# Patient Record
Sex: FEMALE | Race: BLACK OR AFRICAN AMERICAN | NOT HISPANIC OR LATINO | URBAN - METROPOLITAN AREA
[De-identification: names, ages, dates, MRNs, and addresses within clinical notes are randomized per-mention and may not be internally consistent; named-entity substitution may affect disease eponyms.]

---

## 2017-05-23 ENCOUNTER — EMERGENCY (EMERGENCY)
Facility: HOSPITAL | Age: 22
LOS: 0 days | Discharge: HOME | End: 2017-05-24
Attending: EMERGENCY MEDICINE
Payer: MEDICAID

## 2017-05-23 VITALS
TEMPERATURE: 98 F | DIASTOLIC BLOOD PRESSURE: 44 MMHG | SYSTOLIC BLOOD PRESSURE: 110 MMHG | HEART RATE: 60 BPM | RESPIRATION RATE: 16 BRPM | OXYGEN SATURATION: 100 %

## 2017-05-23 VITALS
HEIGHT: 64 IN | RESPIRATION RATE: 16 BRPM | WEIGHT: 128.09 LBS | OXYGEN SATURATION: 99 % | TEMPERATURE: 98 F | HEART RATE: 73 BPM | DIASTOLIC BLOOD PRESSURE: 54 MMHG | SYSTOLIC BLOOD PRESSURE: 112 MMHG

## 2017-05-23 LAB
APPEARANCE UR: CLEAR — SIGNIFICANT CHANGE UP
BILIRUB UR-MCNC: NEGATIVE — SIGNIFICANT CHANGE UP
COLOR SPEC: YELLOW — SIGNIFICANT CHANGE UP
DIFF PNL FLD: NEGATIVE — SIGNIFICANT CHANGE UP
GLUCOSE UR QL: NEGATIVE MG/DL — SIGNIFICANT CHANGE UP
HCG UR QL: NEGATIVE — SIGNIFICANT CHANGE UP
KETONES UR-MCNC: NEGATIVE — SIGNIFICANT CHANGE UP
LEUKOCYTE ESTERASE UR-ACNC: ABNORMAL
NITRITE UR-MCNC: NEGATIVE — SIGNIFICANT CHANGE UP
PH UR: 7 — SIGNIFICANT CHANGE UP (ref 5–8)
PROT UR-MCNC: NEGATIVE MG/DL — SIGNIFICANT CHANGE UP
SP GR SPEC: 1.01 — SIGNIFICANT CHANGE UP (ref 1.01–1.02)
UROBILINOGEN FLD QL: NEGATIVE MG/DL — SIGNIFICANT CHANGE UP

## 2017-05-23 PROCEDURE — 99284 EMERGENCY DEPT VISIT MOD MDM: CPT

## 2017-05-23 RX ORDER — AZITHROMYCIN 500 MG/1
1000 TABLET, FILM COATED ORAL ONCE
Qty: 0 | Refills: 0 | Status: COMPLETED | OUTPATIENT
Start: 2017-05-23 | End: 2017-05-23

## 2017-05-23 RX ORDER — CEFTRIAXONE 500 MG/1
250 INJECTION, POWDER, FOR SOLUTION INTRAMUSCULAR; INTRAVENOUS ONCE
Qty: 0 | Refills: 0 | Status: COMPLETED | OUTPATIENT
Start: 2017-05-23 | End: 2017-05-23

## 2017-05-23 NOTE — ED ADULT NURSE NOTE - OBJECTIVE STATEMENT
Patient reports white vaginal discharge with itching for 4 days. Patient is sexually active. Denies antibiotic use. [id number]. LMP 2017

## 2017-05-24 DIAGNOSIS — N76.0 ACUTE VAGINITIS: ICD-10-CM

## 2017-05-24 DIAGNOSIS — L29.9 PRURITUS, UNSPECIFIED: ICD-10-CM

## 2017-05-24 LAB
BACTERIA # UR AUTO: ABNORMAL
COMMENT - URINE: SIGNIFICANT CHANGE UP
EPI CELLS # UR: ABNORMAL
WBC UR QL: ABNORMAL

## 2017-05-24 RX ORDER — METRONIDAZOLE 500 MG
1000 TABLET ORAL ONCE
Qty: 0 | Refills: 0 | Status: COMPLETED | OUTPATIENT
Start: 2017-05-24 | End: 2017-05-24

## 2017-05-24 RX ORDER — METRONIDAZOLE 500 MG
2 TABLET ORAL
Qty: 2 | Refills: 0 | OUTPATIENT
Start: 2017-05-24 | End: 2017-05-25

## 2017-05-24 RX ADMIN — CEFTRIAXONE 250 MILLIGRAM(S): 500 INJECTION, POWDER, FOR SOLUTION INTRAMUSCULAR; INTRAVENOUS at 00:58

## 2017-05-24 RX ADMIN — Medication 1000 MILLIGRAM(S): at 01:55

## 2017-05-24 RX ADMIN — AZITHROMYCIN 1000 MILLIGRAM(S): 500 TABLET, FILM COATED ORAL at 00:57

## 2017-05-24 NOTE — ED PROVIDER NOTE - OBJECTIVE STATEMENT
Pertinent PMH/PSH/FHx/SHx and Review of Systems contained within:  22 yo f with no PMH presents in ED c/o vaginal itching with foul smelling discharge s/p intercourse with boyfriend. No aggravating or relieving factors, No fever/chills, No photophobia/eye pain/changes in vision, No ear pain/sore throat/dysphagia, No chest pain/palpitations, no SOB/cough/wheeze/stridor, No abdominal pain, No N/V/D, no dysuria/frequency, No neck/back pain, no rash, no changes in neurological status/function.

## 2017-05-24 NOTE — ED PROVIDER NOTE - MEDICAL DECISION MAKING DETAILS
Patient with probably BV and possible std. UA reviewed, GC/Chlamydia sent. Patient received metronidazole, ceftriaxone and azithormycin. Patient now discharged with care instructions. She will follow up with pmd and gyn. Discussed results and outcome of testing with the patient.  Patient advised to please follow up with their primary care doctor within the next 24 hours and return to the Emergency Department for worsening symptoms or any other concerns.  Patient advised that their doctor may call  to follow up on the specific results of the tests performed today in the emergency department.

## 2017-05-25 LAB
C TRACH RRNA SPEC QL NAA+PROBE: SIGNIFICANT CHANGE UP
CULTURE RESULTS: SIGNIFICANT CHANGE UP
N GONORRHOEA RRNA SPEC QL NAA+PROBE: SIGNIFICANT CHANGE UP
SPECIMEN SOURCE: SIGNIFICANT CHANGE UP
SPECIMEN SOURCE: SIGNIFICANT CHANGE UP

## 2018-09-11 ENCOUNTER — EMERGENCY (EMERGENCY)
Facility: HOSPITAL | Age: 23
LOS: 0 days | Discharge: ANOTHER HOSPITAL | End: 2018-09-11
Attending: EMERGENCY MEDICINE
Payer: MEDICAID

## 2018-09-11 VITALS — HEART RATE: 147 BPM | OXYGEN SATURATION: 95 %

## 2018-09-11 VITALS
DIASTOLIC BLOOD PRESSURE: 72 MMHG | HEART RATE: 68 BPM | OXYGEN SATURATION: 93 % | SYSTOLIC BLOOD PRESSURE: 97 MMHG | RESPIRATION RATE: 12 BRPM | WEIGHT: 130.07 LBS | HEIGHT: 67 IN

## 2018-09-11 DIAGNOSIS — I60.8 OTHER NONTRAUMATIC SUBARACHNOID HEMORRHAGE: ICD-10-CM

## 2018-09-11 DIAGNOSIS — I46.9 CARDIAC ARREST, CAUSE UNSPECIFIED: ICD-10-CM

## 2018-09-11 LAB
ALBUMIN SERPL ELPH-MCNC: 3.2 G/DL — LOW (ref 3.3–5)
ALP SERPL-CCNC: 65 U/L — SIGNIFICANT CHANGE UP (ref 40–120)
ALT FLD-CCNC: 34 U/L — SIGNIFICANT CHANGE UP (ref 12–78)
AMPHET UR-MCNC: NEGATIVE — SIGNIFICANT CHANGE UP
ANION GAP SERPL CALC-SCNC: 13 MMOL/L — SIGNIFICANT CHANGE UP (ref 5–17)
APAP SERPL-MCNC: < 2 UG/ML (ref 10–30)
APPEARANCE UR: CLEAR — SIGNIFICANT CHANGE UP
APTT BLD: 28.8 SEC — SIGNIFICANT CHANGE UP (ref 27.5–37.4)
AST SERPL-CCNC: 58 U/L — HIGH (ref 15–37)
BACTERIA # UR AUTO: ABNORMAL
BARBITURATES UR SCN-MCNC: NEGATIVE — SIGNIFICANT CHANGE UP
BASE EXCESS BLDA CALC-SCNC: -8.8 MMOL/L — LOW (ref -2–2)
BASOPHILS # BLD AUTO: 0.1 K/UL — SIGNIFICANT CHANGE UP (ref 0–0.2)
BASOPHILS NFR BLD AUTO: 0.7 % — SIGNIFICANT CHANGE UP (ref 0–2)
BENZODIAZ UR-MCNC: NEGATIVE — SIGNIFICANT CHANGE UP
BILIRUB SERPL-MCNC: 0.3 MG/DL — SIGNIFICANT CHANGE UP (ref 0.2–1.2)
BILIRUB UR-MCNC: NEGATIVE — SIGNIFICANT CHANGE UP
BLOOD GAS COMMENTS: SIGNIFICANT CHANGE UP
BLOOD GAS COMMENTS: SIGNIFICANT CHANGE UP
BLOOD GAS SOURCE: SIGNIFICANT CHANGE UP
BUN SERPL-MCNC: 12 MG/DL — SIGNIFICANT CHANGE UP (ref 7–23)
CALCIUM SERPL-MCNC: 7.9 MG/DL — LOW (ref 8.5–10.1)
CHLORIDE SERPL-SCNC: 108 MMOL/L — SIGNIFICANT CHANGE UP (ref 96–108)
CK MB BLD-MCNC: 1.2 % — SIGNIFICANT CHANGE UP (ref 0–3.5)
CK MB CFR SERPL CALC: 1.4 NG/ML — SIGNIFICANT CHANGE UP (ref 0.5–3.6)
CK SERPL-CCNC: 117 U/L — SIGNIFICANT CHANGE UP (ref 26–192)
CO2 SERPL-SCNC: 21 MMOL/L — LOW (ref 22–31)
COCAINE METAB.OTHER UR-MCNC: NEGATIVE — SIGNIFICANT CHANGE UP
COLOR SPEC: YELLOW — SIGNIFICANT CHANGE UP
CREAT SERPL-MCNC: 1.13 MG/DL — SIGNIFICANT CHANGE UP (ref 0.5–1.3)
DIFF PNL FLD: NEGATIVE — SIGNIFICANT CHANGE UP
EOSINOPHIL # BLD AUTO: 0.09 K/UL — SIGNIFICANT CHANGE UP (ref 0–0.5)
EOSINOPHIL NFR BLD AUTO: 0.6 % — SIGNIFICANT CHANGE UP (ref 0–6)
EPI CELLS # UR: SIGNIFICANT CHANGE UP
GLUCOSE BLDC GLUCOMTR-MCNC: 175 MG/DL — HIGH (ref 70–99)
GLUCOSE SERPL-MCNC: 136 MG/DL — HIGH (ref 70–99)
GLUCOSE UR QL: NEGATIVE MG/DL — SIGNIFICANT CHANGE UP
HCG SERPL-ACNC: <1 MIU/ML — SIGNIFICANT CHANGE UP
HCO3 BLDA-SCNC: 19 MMOL/L — LOW (ref 21–29)
HCT VFR BLD CALC: 43.1 % — SIGNIFICANT CHANGE UP (ref 34.5–45)
HGB BLD-MCNC: 13.9 G/DL — SIGNIFICANT CHANGE UP (ref 11.5–15.5)
HOROWITZ INDEX BLDA+IHG-RTO: 1 — SIGNIFICANT CHANGE UP
IMM GRANULOCYTES NFR BLD AUTO: 0.3 % — SIGNIFICANT CHANGE UP (ref 0–1.5)
INR BLD: 0.98 RATIO — SIGNIFICANT CHANGE UP (ref 0.88–1.16)
KETONES UR-MCNC: NEGATIVE — SIGNIFICANT CHANGE UP
LACTATE SERPL-SCNC: 4.6 MMOL/L — CRITICAL HIGH (ref 0.7–2)
LEUKOCYTE ESTERASE UR-ACNC: NEGATIVE — SIGNIFICANT CHANGE UP
LYMPHOCYTES # BLD AUTO: 46.5 % — HIGH (ref 13–44)
LYMPHOCYTES # BLD AUTO: 6.7 K/UL — HIGH (ref 1–3.3)
MCHC RBC-ENTMCNC: 29.2 PG — SIGNIFICANT CHANGE UP (ref 27–34)
MCHC RBC-ENTMCNC: 32.3 GM/DL — SIGNIFICANT CHANGE UP (ref 32–36)
MCV RBC AUTO: 90.5 FL — SIGNIFICANT CHANGE UP (ref 80–100)
METHADONE UR-MCNC: NEGATIVE — SIGNIFICANT CHANGE UP
MONOCYTES # BLD AUTO: 0.92 K/UL — HIGH (ref 0–0.9)
MONOCYTES NFR BLD AUTO: 6.4 % — SIGNIFICANT CHANGE UP (ref 2–14)
NEUTROPHILS # BLD AUTO: 6.57 K/UL — SIGNIFICANT CHANGE UP (ref 1.8–7.4)
NEUTROPHILS NFR BLD AUTO: 45.5 % — SIGNIFICANT CHANGE UP (ref 43–77)
NITRITE UR-MCNC: NEGATIVE — SIGNIFICANT CHANGE UP
NRBC # BLD: 0 /100 WBCS — SIGNIFICANT CHANGE UP (ref 0–0)
OPIATES UR-MCNC: NEGATIVE — SIGNIFICANT CHANGE UP
PCO2 BLDA: 46 MMHG — SIGNIFICANT CHANGE UP (ref 32–46)
PCP SPEC-MCNC: SIGNIFICANT CHANGE UP
PCP UR-MCNC: NEGATIVE — SIGNIFICANT CHANGE UP
PH BLD: 7.23 — LOW (ref 7.35–7.45)
PH UR: 6 — SIGNIFICANT CHANGE UP (ref 5–8)
PLATELET # BLD AUTO: 477 K/UL — HIGH (ref 150–400)
PO2 BLDA: 71 MMHG — LOW (ref 74–108)
POTASSIUM SERPL-MCNC: 3.6 MMOL/L — SIGNIFICANT CHANGE UP (ref 3.5–5.3)
POTASSIUM SERPL-SCNC: 3.6 MMOL/L — SIGNIFICANT CHANGE UP (ref 3.5–5.3)
PROT SERPL-MCNC: 6.9 GM/DL — SIGNIFICANT CHANGE UP (ref 6–8.3)
PROT UR-MCNC: 30 MG/DL
PROTHROM AB SERPL-ACNC: 10.7 SEC — SIGNIFICANT CHANGE UP (ref 9.8–12.7)
RBC # BLD: 4.76 M/UL — SIGNIFICANT CHANGE UP (ref 3.8–5.2)
RBC # FLD: 13.8 % — SIGNIFICANT CHANGE UP (ref 10.3–14.5)
RBC CASTS # UR COMP ASSIST: SIGNIFICANT CHANGE UP /HPF (ref 0–4)
SALICYLATES SERPL-MCNC: <1.7 MG/DL — LOW (ref 2.8–20)
SAO2 % BLDA: 90 % — LOW (ref 92–96)
SODIUM SERPL-SCNC: 142 MMOL/L — SIGNIFICANT CHANGE UP (ref 135–145)
SP GR SPEC: 1.01 — SIGNIFICANT CHANGE UP (ref 1.01–1.02)
THC UR QL: POSITIVE — SIGNIFICANT CHANGE UP
TROPONIN I SERPL-MCNC: 0.1 NG/ML — HIGH (ref 0.01–0.04)
UROBILINOGEN FLD QL: NEGATIVE MG/DL — SIGNIFICANT CHANGE UP
WBC # BLD: 14.42 K/UL — HIGH (ref 3.8–10.5)
WBC # FLD AUTO: 14.42 K/UL — HIGH (ref 3.8–10.5)
WBC UR QL: SIGNIFICANT CHANGE UP

## 2018-09-11 PROCEDURE — 93010 ELECTROCARDIOGRAM REPORT: CPT

## 2018-09-11 PROCEDURE — 70450 CT HEAD/BRAIN W/O DYE: CPT | Mod: 26

## 2018-09-11 PROCEDURE — 99291 CRITICAL CARE FIRST HOUR: CPT | Mod: 25

## 2018-09-11 PROCEDURE — 31500 INSERT EMERGENCY AIRWAY: CPT

## 2018-09-11 PROCEDURE — 99292 CRITICAL CARE ADDL 30 MIN: CPT | Mod: 25

## 2018-09-11 PROCEDURE — 71045 X-RAY EXAM CHEST 1 VIEW: CPT | Mod: 26,77

## 2018-09-11 PROCEDURE — 71045 X-RAY EXAM CHEST 1 VIEW: CPT | Mod: 26

## 2018-09-11 RX ORDER — SODIUM CHLORIDE 9 MG/ML
2000 INJECTION INTRAMUSCULAR; INTRAVENOUS; SUBCUTANEOUS ONCE
Refills: 0 | Status: COMPLETED | OUTPATIENT
Start: 2018-09-11 | End: 2018-09-11

## 2018-09-11 RX ORDER — FENTANYL CITRATE 50 UG/ML
0.5 INJECTION INTRAVENOUS
Qty: 5000 | Refills: 0 | Status: DISCONTINUED | OUTPATIENT
Start: 2018-09-11 | End: 2018-09-11

## 2018-09-11 RX ORDER — ROBINUL 0.2 MG/ML
2 INJECTION INTRAMUSCULAR; INTRAVENOUS ONCE
Refills: 0 | Status: DISCONTINUED | OUTPATIENT
Start: 2018-09-11 | End: 2018-09-11

## 2018-09-11 RX ORDER — ROBINUL 0.2 MG/ML
0.2 INJECTION INTRAMUSCULAR; INTRAVENOUS ONCE
Refills: 0 | Status: COMPLETED | OUTPATIENT
Start: 2018-09-11 | End: 2018-09-11

## 2018-09-11 RX ORDER — FENTANYL CITRATE 50 UG/ML
0.5 INJECTION INTRAVENOUS
Qty: 2500 | Refills: 0 | Status: DISCONTINUED | OUTPATIENT
Start: 2018-09-11 | End: 2018-09-11

## 2018-09-11 RX ORDER — PROPOFOL 10 MG/ML
5 INJECTION, EMULSION INTRAVENOUS
Qty: 1000 | Refills: 0 | Status: DISCONTINUED | OUTPATIENT
Start: 2018-09-11 | End: 2018-09-11

## 2018-09-11 RX ORDER — LEVETIRACETAM 250 MG/1
1000 TABLET, FILM COATED ORAL ONCE
Refills: 0 | Status: COMPLETED | OUTPATIENT
Start: 2018-09-11 | End: 2018-09-11

## 2018-09-11 RX ADMIN — SODIUM CHLORIDE 2000 MILLILITER(S): 9 INJECTION INTRAMUSCULAR; INTRAVENOUS; SUBCUTANEOUS at 21:00

## 2018-09-11 RX ADMIN — LEVETIRACETAM 400 MILLIGRAM(S): 250 TABLET, FILM COATED ORAL at 21:25

## 2018-09-11 RX ADMIN — ROBINUL 0.2 MILLIGRAM(S): 0.2 INJECTION INTRAMUSCULAR; INTRAVENOUS at 22:24

## 2018-09-11 RX ADMIN — SODIUM CHLORIDE 2000 MILLILITER(S): 9 INJECTION INTRAMUSCULAR; INTRAVENOUS; SUBCUTANEOUS at 21:30

## 2018-09-11 NOTE — ED ADULT TRIAGE NOTE - CHIEF COMPLAINT QUOTE
Patient BIBA: Ambu bag in progress: no gag reflex. Patient with eyes Deviated to right, Pupils pinpoint non reactive. Placed on Bed 2 Triaged at bedside. FS 17.  gauge to Left AC with NS running wide open, mg Narcan given PTA. Per Significant other, patient complained of  "severe headache, lay down on bed then started shaking all over."

## 2018-09-11 NOTE — ED PROVIDER NOTE - MEDICAL DECISION MAKING DETAILS
Labs and imaging reviewed and discussed with neurosurgery. patient transferred to [facility] for further management.

## 2018-09-11 NOTE — ED ADULT NURSE NOTE - OBJECTIVE STATEMENT
as per ems pt was c/p of severe HA after work, 1 seizure episode 2mins, was unresponsive when EMS arrived, pupils pinpoint, left eye devieated to the left 1mg given  as pr , he picjked up from work. they had dinner ate, 30 minds later she was c/o of migraine and was rubbning temples. five mins later while lyuing down in bed, he felt the bed shaking. as per ems pt was c/o of severe HA after work, 1 seizure episode that lasted 2mins.  Pt was unresponsive when EMS arrived, pupils pinpoint, left eye deviated to the left 1mg of Narcan given.    as pr , he picked up his wife from work. They had dinner. 30 mins later she was c/o of migraine and was rubbing her temples. Five mins later while lying down in bed, he felt the bed shaking.  He turned over to find her seizing in bed.  Her  postioned her to the left side, blood was coming out of her mouth.  Then 911 was called. as per ems pt was c/o of severe HA after work, 1 seizure episode that lasted 2mins.  Pt was unresponsive when EMS arrived, pupils pinpoint, left eye deviated to the left 1mg of Narcan given.    as pr , he picked up his wife from work. They had dinner. 30 mins later she was c/o of terrible migraine and was rubbing her temples. Five mins later while lying down in bed, he felt the bed shaking.  He turned over to find her seizing in bed.  Her  postioned her to the left side, blood was coming out of her mouth.  Then 911 was called.

## 2018-09-11 NOTE — ED PROVIDER NOTE - OBJECTIVE STATEMENT
Pertinent PMH/PSH/FHx/SHx and Review of Systems contained within:  21 yo f with no pmh BIBEMS for unresponsive with respiratory failure. As per boyfriend, patient c/o headache earlier in the day but took a tylenol and ate stating she felt better. Later when they were sleeping boyfriend felt shaking on the bed and saw her having convulsions. Convulsions lasted 5 minutes and the she became unresponsive and limp. Upon arrival of ems, patient unresponsive and hypoxic thus they ventilated patient via BVM. In ED, patient noted to be unresponsive without gag reflex, corneal reflex or pain reflex and intubated immediately. Decorticate posturing noted shortly after intubation. Frothy sputum coming out of ETT, patient having to be manually vented via BVM to keep saturation above 90%.

## 2018-09-12 ENCOUNTER — INPATIENT (INPATIENT)
Facility: HOSPITAL | Age: 23
LOS: 7 days | DRG: 64 | End: 2018-09-20
Attending: NEUROLOGICAL SURGERY | Admitting: NEUROLOGICAL SURGERY
Payer: MEDICAID

## 2018-09-12 VITALS
HEART RATE: 125 BPM | DIASTOLIC BLOOD PRESSURE: 75 MMHG | SYSTOLIC BLOOD PRESSURE: 105 MMHG | OXYGEN SATURATION: 83 % | RESPIRATION RATE: 18 BRPM

## 2018-09-12 DIAGNOSIS — H61.129: ICD-10-CM

## 2018-09-12 DIAGNOSIS — J96.00 ACUTE RESPIRATORY FAILURE, UNSPECIFIED WHETHER WITH HYPOXIA OR HYPERCAPNIA: ICD-10-CM

## 2018-09-12 DIAGNOSIS — Z51.5 ENCOUNTER FOR PALLIATIVE CARE: ICD-10-CM

## 2018-09-12 DIAGNOSIS — I60.9 NONTRAUMATIC SUBARACHNOID HEMORRHAGE, UNSPECIFIED: ICD-10-CM

## 2018-09-12 DIAGNOSIS — I42.9 CARDIOMYOPATHY, UNSPECIFIED: ICD-10-CM

## 2018-09-12 DIAGNOSIS — J80 ACUTE RESPIRATORY DISTRESS SYNDROME: ICD-10-CM

## 2018-09-12 DIAGNOSIS — G93.40 ENCEPHALOPATHY, UNSPECIFIED: ICD-10-CM

## 2018-09-12 DIAGNOSIS — H61.12: ICD-10-CM

## 2018-09-12 LAB
ALBUMIN SERPL ELPH-MCNC: 2.6 G/DL — LOW (ref 3.3–5)
ALP SERPL-CCNC: 48 U/L — SIGNIFICANT CHANGE UP (ref 40–120)
ALT FLD-CCNC: 53 U/L — HIGH (ref 10–45)
ANION GAP SERPL CALC-SCNC: 10 MMOL/L — SIGNIFICANT CHANGE UP (ref 5–17)
ANION GAP SERPL CALC-SCNC: 11 MMOL/L — SIGNIFICANT CHANGE UP (ref 5–17)
ANION GAP SERPL CALC-SCNC: 6 MMOL/L — SIGNIFICANT CHANGE UP (ref 5–17)
ANION GAP SERPL CALC-SCNC: 8 MMOL/L — SIGNIFICANT CHANGE UP (ref 5–17)
APPEARANCE UR: CLEAR — SIGNIFICANT CHANGE UP
APTT BLD: 29.2 SEC — SIGNIFICANT CHANGE UP (ref 27.5–37.4)
AST SERPL-CCNC: 66 U/L — HIGH (ref 10–40)
BASE EXCESS BLDA CALC-SCNC: -1.9 MMOL/L — SIGNIFICANT CHANGE UP (ref -2–2)
BASE EXCESS BLDA CALC-SCNC: 0.7 MMOL/L — SIGNIFICANT CHANGE UP (ref -2–2)
BASE EXCESS BLDV CALC-SCNC: -10.7 MMOL/L — LOW (ref -2–2)
BILIRUB SERPL-MCNC: 0.1 MG/DL — LOW (ref 0.2–1.2)
BILIRUB UR-MCNC: NEGATIVE — SIGNIFICANT CHANGE UP
BLD GP AB SCN SERPL QL: NEGATIVE — SIGNIFICANT CHANGE UP
BUN SERPL-MCNC: 10 MG/DL — SIGNIFICANT CHANGE UP (ref 7–23)
BUN SERPL-MCNC: 11 MG/DL — SIGNIFICANT CHANGE UP (ref 7–23)
BUN SERPL-MCNC: 11 MG/DL — SIGNIFICANT CHANGE UP (ref 7–23)
BUN SERPL-MCNC: 12 MG/DL — SIGNIFICANT CHANGE UP (ref 7–23)
CALCIUM SERPL-MCNC: 6 MG/DL — CRITICAL LOW (ref 8.4–10.5)
CALCIUM SERPL-MCNC: 6.3 MG/DL — CRITICAL LOW (ref 8.4–10.5)
CALCIUM SERPL-MCNC: 6.7 MG/DL — LOW (ref 8.4–10.5)
CALCIUM SERPL-MCNC: 7.2 MG/DL — LOW (ref 8.4–10.5)
CHLORIDE SERPL-SCNC: 122 MMOL/L — HIGH (ref 96–108)
CHLORIDE SERPL-SCNC: 123 MMOL/L — HIGH (ref 96–108)
CHLORIDE SERPL-SCNC: 124 MMOL/L — HIGH (ref 96–108)
CHLORIDE SERPL-SCNC: 125 MMOL/L — HIGH (ref 96–108)
CHOLEST SERPL-MCNC: 124 MG/DL — SIGNIFICANT CHANGE UP (ref 10–199)
CO2 BLDA-SCNC: 30 MMOL/L — SIGNIFICANT CHANGE UP (ref 22–30)
CO2 BLDA-SCNC: 30 MMOL/L — SIGNIFICANT CHANGE UP (ref 22–30)
CO2 BLDV-SCNC: 21 MMOL/L — LOW (ref 22–30)
CO2 SERPL-SCNC: 18 MMOL/L — LOW (ref 22–31)
CO2 SERPL-SCNC: 19 MMOL/L — LOW (ref 22–31)
CO2 SERPL-SCNC: 23 MMOL/L — SIGNIFICANT CHANGE UP (ref 22–31)
CO2 SERPL-SCNC: 27 MMOL/L — SIGNIFICANT CHANGE UP (ref 22–31)
COLOR SPEC: COLORLESS — SIGNIFICANT CHANGE UP
CREAT SERPL-MCNC: 0.73 MG/DL — SIGNIFICANT CHANGE UP (ref 0.5–1.3)
CREAT SERPL-MCNC: 0.78 MG/DL — SIGNIFICANT CHANGE UP (ref 0.5–1.3)
CREAT SERPL-MCNC: 0.79 MG/DL — SIGNIFICANT CHANGE UP (ref 0.5–1.3)
CREAT SERPL-MCNC: 1.12 MG/DL — SIGNIFICANT CHANGE UP (ref 0.5–1.3)
DIFF PNL FLD: ABNORMAL
GAS PNL BLDA: SIGNIFICANT CHANGE UP
GAS PNL BLDV: SIGNIFICANT CHANGE UP
GAS PNL BLDV: SIGNIFICANT CHANGE UP
GLUCOSE BLDC GLUCOMTR-MCNC: 114 MG/DL — HIGH (ref 70–99)
GLUCOSE SERPL-MCNC: 133 MG/DL — HIGH (ref 70–99)
GLUCOSE SERPL-MCNC: 161 MG/DL — HIGH (ref 70–99)
GLUCOSE SERPL-MCNC: 216 MG/DL — HIGH (ref 70–99)
GLUCOSE SERPL-MCNC: 228 MG/DL — HIGH (ref 70–99)
GLUCOSE UR QL: NEGATIVE — SIGNIFICANT CHANGE UP
HBA1C BLD-MCNC: 5.2 % — SIGNIFICANT CHANGE UP (ref 4–5.6)
HCO3 BLDA-SCNC: 28 MMOL/L — SIGNIFICANT CHANGE UP (ref 21–29)
HCO3 BLDA-SCNC: 29 MMOL/L — SIGNIFICANT CHANGE UP (ref 21–29)
HCO3 BLDV-SCNC: 20 MMOL/L — LOW (ref 21–29)
HCT VFR BLD CALC: 38.7 % — SIGNIFICANT CHANGE UP (ref 34.5–45)
HCT VFR BLD CALC: 42.2 % — SIGNIFICANT CHANGE UP (ref 34.5–45)
HCT VFR BLD CALC: 42.7 % — SIGNIFICANT CHANGE UP (ref 34.5–45)
HCT VFR BLD CALC: 46.4 % — HIGH (ref 34.5–45)
HDLC SERPL-MCNC: 43 MG/DL — LOW
HGB BLD-MCNC: 12.5 G/DL — SIGNIFICANT CHANGE UP (ref 11.5–15.5)
HGB BLD-MCNC: 13 G/DL — SIGNIFICANT CHANGE UP (ref 11.5–15.5)
HGB BLD-MCNC: 14.1 G/DL — SIGNIFICANT CHANGE UP (ref 11.5–15.5)
HGB BLD-MCNC: 15.1 G/DL — SIGNIFICANT CHANGE UP (ref 11.5–15.5)
HOROWITZ INDEX BLDA+IHG-RTO: 90 — SIGNIFICANT CHANGE UP
HOROWITZ INDEX BLDA+IHG-RTO: 90 — SIGNIFICANT CHANGE UP
INR BLD: 1.24 RATIO — HIGH (ref 0.88–1.16)
KETONES UR-MCNC: NEGATIVE — SIGNIFICANT CHANGE UP
LEUKOCYTE ESTERASE UR-ACNC: NEGATIVE — SIGNIFICANT CHANGE UP
LIPID PNL WITH DIRECT LDL SERPL: 72 MG/DL — SIGNIFICANT CHANGE UP
MAGNESIUM SERPL-MCNC: 1.4 MG/DL — LOW (ref 1.6–2.6)
MAGNESIUM SERPL-MCNC: 1.7 MG/DL — SIGNIFICANT CHANGE UP (ref 1.6–2.6)
MAGNESIUM SERPL-MCNC: 1.8 MG/DL — SIGNIFICANT CHANGE UP (ref 1.6–2.6)
MAGNESIUM SERPL-MCNC: 1.8 MG/DL — SIGNIFICANT CHANGE UP (ref 1.6–2.6)
MCHC RBC-ENTMCNC: 28.7 PG — SIGNIFICANT CHANGE UP (ref 27–34)
MCHC RBC-ENTMCNC: 29.8 PG — SIGNIFICANT CHANGE UP (ref 27–34)
MCHC RBC-ENTMCNC: 30 PG — SIGNIFICANT CHANGE UP (ref 27–34)
MCHC RBC-ENTMCNC: 30.7 PG — SIGNIFICANT CHANGE UP (ref 27–34)
MCHC RBC-ENTMCNC: 30.8 GM/DL — LOW (ref 32–36)
MCHC RBC-ENTMCNC: 32.4 GM/DL — SIGNIFICANT CHANGE UP (ref 32–36)
MCHC RBC-ENTMCNC: 32.5 GM/DL — SIGNIFICANT CHANGE UP (ref 32–36)
MCHC RBC-ENTMCNC: 33.1 GM/DL — SIGNIFICANT CHANGE UP (ref 32–36)
MCV RBC AUTO: 92 FL — SIGNIFICANT CHANGE UP (ref 80–100)
MCV RBC AUTO: 92.3 FL — SIGNIFICANT CHANGE UP (ref 80–100)
MCV RBC AUTO: 92.7 FL — SIGNIFICANT CHANGE UP (ref 80–100)
MCV RBC AUTO: 93.2 FL — SIGNIFICANT CHANGE UP (ref 80–100)
NITRITE UR-MCNC: NEGATIVE — SIGNIFICANT CHANGE UP
OSMOLALITY SERPL: 330 MOS/KG — HIGH (ref 275–300)
PCO2 BLDA: 63 MMHG — HIGH (ref 32–46)
PCO2 BLDA: 75 MMHG — CRITICAL HIGH (ref 32–46)
PCO2 BLDV: 61 MMHG — HIGH (ref 35–50)
PCP SPEC-MCNC: SIGNIFICANT CHANGE UP
PH BLDA: 7.2 — CRITICAL LOW (ref 7.35–7.45)
PH BLDA: 7.28 — LOW (ref 7.35–7.45)
PH BLDV: 7.13 — CRITICAL LOW (ref 7.35–7.45)
PH UR: 6 — SIGNIFICANT CHANGE UP (ref 5–8)
PHOSPHATE SERPL-MCNC: 2.1 MG/DL — LOW (ref 2.5–4.5)
PHOSPHATE SERPL-MCNC: 2.6 MG/DL — SIGNIFICANT CHANGE UP (ref 2.5–4.5)
PHOSPHATE SERPL-MCNC: 3.1 MG/DL — SIGNIFICANT CHANGE UP (ref 2.5–4.5)
PHOSPHATE SERPL-MCNC: 4.4 MG/DL — SIGNIFICANT CHANGE UP (ref 2.5–4.5)
PLATELET # BLD AUTO: 351 K/UL — SIGNIFICANT CHANGE UP (ref 150–400)
PLATELET # BLD AUTO: 367 K/UL — SIGNIFICANT CHANGE UP (ref 150–400)
PLATELET # BLD AUTO: 416 K/UL — HIGH (ref 150–400)
PLATELET # BLD AUTO: 473 K/UL — HIGH (ref 150–400)
PO2 BLDA: 66 MMHG — LOW (ref 74–108)
PO2 BLDA: 74 MMHG — SIGNIFICANT CHANGE UP (ref 74–108)
PO2 BLDV: 45 MMHG — SIGNIFICANT CHANGE UP (ref 25–45)
POTASSIUM SERPL-MCNC: 4.3 MMOL/L — SIGNIFICANT CHANGE UP (ref 3.5–5.3)
POTASSIUM SERPL-MCNC: 4.6 MMOL/L — SIGNIFICANT CHANGE UP (ref 3.5–5.3)
POTASSIUM SERPL-MCNC: 4.7 MMOL/L — SIGNIFICANT CHANGE UP (ref 3.5–5.3)
POTASSIUM SERPL-MCNC: 6.8 MMOL/L — CRITICAL HIGH (ref 3.5–5.3)
POTASSIUM SERPL-SCNC: 4.3 MMOL/L — SIGNIFICANT CHANGE UP (ref 3.5–5.3)
POTASSIUM SERPL-SCNC: 4.6 MMOL/L — SIGNIFICANT CHANGE UP (ref 3.5–5.3)
POTASSIUM SERPL-SCNC: 4.7 MMOL/L — SIGNIFICANT CHANGE UP (ref 3.5–5.3)
POTASSIUM SERPL-SCNC: 6.8 MMOL/L — CRITICAL HIGH (ref 3.5–5.3)
PROT SERPL-MCNC: 5.1 G/DL — LOW (ref 6–8.3)
PROT UR-MCNC: SIGNIFICANT CHANGE UP
PROTHROM AB SERPL-ACNC: 13.5 SEC — HIGH (ref 9.8–12.7)
RBC # BLD: 4.21 M/UL — SIGNIFICANT CHANGE UP (ref 3.8–5.2)
RBC # BLD: 4.53 M/UL — SIGNIFICANT CHANGE UP (ref 3.8–5.2)
RBC # BLD: 4.61 M/UL — SIGNIFICANT CHANGE UP (ref 3.8–5.2)
RBC # BLD: 5.03 M/UL — SIGNIFICANT CHANGE UP (ref 3.8–5.2)
RBC # FLD: 12.6 % — SIGNIFICANT CHANGE UP (ref 10.3–14.5)
RBC # FLD: 12.8 % — SIGNIFICANT CHANGE UP (ref 10.3–14.5)
RBC # FLD: 12.9 % — SIGNIFICANT CHANGE UP (ref 10.3–14.5)
RBC # FLD: 12.9 % — SIGNIFICANT CHANGE UP (ref 10.3–14.5)
RH IG SCN BLD-IMP: POSITIVE — SIGNIFICANT CHANGE UP
RH IG SCN BLD-IMP: POSITIVE — SIGNIFICANT CHANGE UP
SAO2 % BLDA: 90 % — LOW (ref 92–96)
SAO2 % BLDA: 94 % — SIGNIFICANT CHANGE UP (ref 92–96)
SAO2 % BLDV: 67 % — SIGNIFICANT CHANGE UP (ref 67–88)
SODIUM SERPL-SCNC: 151 MMOL/L — HIGH (ref 135–145)
SODIUM SERPL-SCNC: 152 MMOL/L — HIGH (ref 135–145)
SODIUM SERPL-SCNC: 153 MMOL/L — HIGH (ref 135–145)
SODIUM SERPL-SCNC: 159 MMOL/L — HIGH (ref 135–145)
SP GR SPEC: 1.04 — HIGH (ref 1.01–1.02)
T3 SERPL-MCNC: 76 NG/DL — LOW (ref 80–200)
T4 AB SER-ACNC: 3.9 UG/DL — LOW (ref 4.6–12)
TOTAL CHOLESTEROL/HDL RATIO MEASUREMENT: 2.9 RATIO — LOW (ref 3.3–7.1)
TRIGL SERPL-MCNC: 46 MG/DL — SIGNIFICANT CHANGE UP (ref 10–149)
TROPONIN T, HIGH SENSITIVITY RESULT: 1029 NG/L — HIGH (ref 0–51)
TROPONIN T, HIGH SENSITIVITY RESULT: 878 NG/L — HIGH (ref 0–51)
TSH SERPL-MCNC: 5.64 UIU/ML — HIGH (ref 0.27–4.2)
UROBILINOGEN FLD QL: NEGATIVE — SIGNIFICANT CHANGE UP
WBC # BLD: 11.6 K/UL — HIGH (ref 3.8–10.5)
WBC # BLD: 25.4 K/UL — HIGH (ref 3.8–10.5)
WBC # BLD: 26.2 K/UL — HIGH (ref 3.8–10.5)
WBC # BLD: 30.3 K/UL — HIGH (ref 3.8–10.5)
WBC # FLD AUTO: 11.6 K/UL — HIGH (ref 3.8–10.5)
WBC # FLD AUTO: 25.4 K/UL — HIGH (ref 3.8–10.5)
WBC # FLD AUTO: 26.2 K/UL — HIGH (ref 3.8–10.5)
WBC # FLD AUTO: 30.3 K/UL — HIGH (ref 3.8–10.5)

## 2018-09-12 PROCEDURE — 99292 CRITICAL CARE ADDL 30 MIN: CPT

## 2018-09-12 PROCEDURE — 93886 INTRACRANIAL COMPLETE STUDY: CPT | Mod: 26

## 2018-09-12 PROCEDURE — 95819 EEG AWAKE AND ASLEEP: CPT | Mod: 26

## 2018-09-12 PROCEDURE — 99223 1ST HOSP IP/OBS HIGH 75: CPT | Mod: 25

## 2018-09-12 PROCEDURE — 99291 CRITICAL CARE FIRST HOUR: CPT

## 2018-09-12 PROCEDURE — 99222 1ST HOSP IP/OBS MODERATE 55: CPT

## 2018-09-12 PROCEDURE — 71045 X-RAY EXAM CHEST 1 VIEW: CPT | Mod: 26

## 2018-09-12 PROCEDURE — 99223 1ST HOSP IP/OBS HIGH 75: CPT

## 2018-09-12 PROCEDURE — 36620 INSERTION CATHETER ARTERY: CPT

## 2018-09-12 PROCEDURE — 93306 TTE W/DOPPLER COMPLETE: CPT | Mod: 26

## 2018-09-12 RX ORDER — HYDROCORTISONE 20 MG
50 TABLET ORAL EVERY 6 HOURS
Refills: 0 | Status: DISCONTINUED | OUTPATIENT
Start: 2018-09-12 | End: 2018-09-13

## 2018-09-12 RX ORDER — MANNITOL
100 POWDER (GRAM) MISCELLANEOUS ONCE
Refills: 0 | Status: COMPLETED | OUTPATIENT
Start: 2018-09-12 | End: 2018-09-12

## 2018-09-12 RX ORDER — VASOPRESSIN 20 [USP'U]/ML
0.02 INJECTION INTRAVENOUS
Qty: 100 | Refills: 0 | Status: DISCONTINUED | OUTPATIENT
Start: 2018-09-12 | End: 2018-09-12

## 2018-09-12 RX ORDER — INSULIN HUMAN 100 [IU]/ML
10 INJECTION, SOLUTION SUBCUTANEOUS ONCE
Refills: 0 | Status: COMPLETED | OUTPATIENT
Start: 2018-09-12 | End: 2018-09-12

## 2018-09-12 RX ORDER — CALCIUM GLUCONATE 100 MG/ML
2 VIAL (ML) INTRAVENOUS ONCE
Refills: 0 | Status: COMPLETED | OUTPATIENT
Start: 2018-09-12 | End: 2018-09-12

## 2018-09-12 RX ORDER — VASOPRESSIN 20 [USP'U]/ML
0.06 INJECTION INTRAVENOUS
Qty: 100 | Refills: 0 | Status: DISCONTINUED | OUTPATIENT
Start: 2018-09-12 | End: 2018-09-13

## 2018-09-12 RX ORDER — SODIUM BICARBONATE 1 MEQ/ML
50 SYRINGE (ML) INTRAVENOUS ONCE
Refills: 0 | Status: COMPLETED | OUTPATIENT
Start: 2018-09-12 | End: 2018-09-12

## 2018-09-12 RX ORDER — LEVETIRACETAM 250 MG/1
1000 TABLET, FILM COATED ORAL
Refills: 0 | Status: DISCONTINUED | OUTPATIENT
Start: 2018-09-12 | End: 2018-09-17

## 2018-09-12 RX ORDER — ACETAMINOPHEN 500 MG
650 TABLET ORAL EVERY 6 HOURS
Refills: 0 | Status: DISCONTINUED | OUTPATIENT
Start: 2018-09-12 | End: 2018-09-17

## 2018-09-12 RX ORDER — AMPICILLIN SODIUM AND SULBACTAM SODIUM 250; 125 MG/ML; MG/ML
INJECTION, POWDER, FOR SUSPENSION INTRAMUSCULAR; INTRAVENOUS
Refills: 0 | Status: DISCONTINUED | OUTPATIENT
Start: 2018-09-12 | End: 2018-09-16

## 2018-09-12 RX ORDER — SODIUM BICARBONATE 1 MEQ/ML
25 SYRINGE (ML) INTRAVENOUS ONCE
Refills: 0 | Status: COMPLETED | OUTPATIENT
Start: 2018-09-12 | End: 2018-09-12

## 2018-09-12 RX ORDER — DEXTROSE 50 % IN WATER 50 %
50 SYRINGE (ML) INTRAVENOUS ONCE
Refills: 0 | Status: COMPLETED | OUTPATIENT
Start: 2018-09-12 | End: 2018-09-12

## 2018-09-12 RX ORDER — SODIUM CHLORIDE 9 MG/ML
1000 INJECTION INTRAMUSCULAR; INTRAVENOUS; SUBCUTANEOUS ONCE
Refills: 0 | Status: COMPLETED | OUTPATIENT
Start: 2018-09-12 | End: 2018-09-12

## 2018-09-12 RX ORDER — PHENYLEPHRINE HYDROCHLORIDE 10 MG/ML
0.4 INJECTION INTRAVENOUS
Qty: 160 | Refills: 0 | Status: DISCONTINUED | OUTPATIENT
Start: 2018-09-12 | End: 2018-09-14

## 2018-09-12 RX ORDER — SODIUM BICARBONATE 1 MEQ/ML
2 SYRINGE (ML) INTRAVENOUS ONCE
Refills: 0 | Status: DISCONTINUED | OUTPATIENT
Start: 2018-09-12 | End: 2018-09-12

## 2018-09-12 RX ORDER — AMPICILLIN SODIUM AND SULBACTAM SODIUM 250; 125 MG/ML; MG/ML
3 INJECTION, POWDER, FOR SUSPENSION INTRAMUSCULAR; INTRAVENOUS EVERY 6 HOURS
Refills: 0 | Status: DISCONTINUED | OUTPATIENT
Start: 2018-09-12 | End: 2018-09-16

## 2018-09-12 RX ORDER — AMPICILLIN SODIUM AND SULBACTAM SODIUM 250; 125 MG/ML; MG/ML
3 INJECTION, POWDER, FOR SUSPENSION INTRAMUSCULAR; INTRAVENOUS ONCE
Refills: 0 | Status: COMPLETED | OUTPATIENT
Start: 2018-09-12 | End: 2018-09-12

## 2018-09-12 RX ORDER — LEVETIRACETAM 250 MG/1
1000 TABLET, FILM COATED ORAL EVERY 12 HOURS
Refills: 0 | Status: DISCONTINUED | OUTPATIENT
Start: 2018-09-12 | End: 2018-09-12

## 2018-09-12 RX ORDER — MILRINONE LACTATE 1 MG/ML
0.12 INJECTION, SOLUTION INTRAVENOUS
Qty: 20 | Refills: 0 | Status: DISCONTINUED | OUTPATIENT
Start: 2018-09-12 | End: 2018-09-13

## 2018-09-12 RX ORDER — NOREPINEPHRINE BITARTRATE/D5W 8 MG/250ML
0.05 PLASTIC BAG, INJECTION (ML) INTRAVENOUS
Qty: 16 | Refills: 0 | Status: DISCONTINUED | OUTPATIENT
Start: 2018-09-12 | End: 2018-09-19

## 2018-09-12 RX ORDER — DOBUTAMINE HCL 250MG/20ML
2.5 VIAL (ML) INTRAVENOUS
Qty: 500 | Refills: 0 | Status: DISCONTINUED | OUTPATIENT
Start: 2018-09-12 | End: 2018-09-12

## 2018-09-12 RX ORDER — VANCOMYCIN HCL 1 G
1000 VIAL (EA) INTRAVENOUS ONCE
Refills: 0 | Status: COMPLETED | OUTPATIENT
Start: 2018-09-12 | End: 2018-09-12

## 2018-09-12 RX ORDER — PANTOPRAZOLE SODIUM 20 MG/1
40 TABLET, DELAYED RELEASE ORAL DAILY
Refills: 0 | Status: DISCONTINUED | OUTPATIENT
Start: 2018-09-12 | End: 2018-09-17

## 2018-09-12 RX ORDER — DEXTROSE MONOHYDRATE, SODIUM CHLORIDE, AND POTASSIUM CHLORIDE 50; .745; 4.5 G/1000ML; G/1000ML; G/1000ML
1000 INJECTION, SOLUTION INTRAVENOUS
Refills: 0 | Status: DISCONTINUED | OUTPATIENT
Start: 2018-09-12 | End: 2018-09-12

## 2018-09-12 RX ORDER — VANCOMYCIN HCL 1 G
VIAL (EA) INTRAVENOUS
Refills: 0 | Status: DISCONTINUED | OUTPATIENT
Start: 2018-09-12 | End: 2018-09-13

## 2018-09-12 RX ORDER — FUROSEMIDE 40 MG
20 TABLET ORAL ONCE
Refills: 0 | Status: COMPLETED | OUTPATIENT
Start: 2018-09-12 | End: 2018-09-12

## 2018-09-12 RX ORDER — VANCOMYCIN HCL 1 G
1000 VIAL (EA) INTRAVENOUS EVERY 12 HOURS
Refills: 0 | Status: DISCONTINUED | OUTPATIENT
Start: 2018-09-12 | End: 2018-09-13

## 2018-09-12 RX ORDER — SODIUM CHLORIDE 9 MG/ML
500 INJECTION INTRAMUSCULAR; INTRAVENOUS; SUBCUTANEOUS ONCE
Refills: 0 | Status: COMPLETED | OUTPATIENT
Start: 2018-09-12 | End: 2018-09-12

## 2018-09-12 RX ORDER — INFLUENZA VIRUS VACCINE 15; 15; 15; 15 UG/.5ML; UG/.5ML; UG/.5ML; UG/.5ML
0.5 SUSPENSION INTRAMUSCULAR ONCE
Refills: 0 | Status: DISCONTINUED | OUTPATIENT
Start: 2018-09-12 | End: 2018-09-17

## 2018-09-12 RX ORDER — SODIUM CHLORIDE 9 MG/ML
1000 INJECTION INTRAMUSCULAR; INTRAVENOUS; SUBCUTANEOUS
Refills: 0 | Status: DISCONTINUED | OUTPATIENT
Start: 2018-09-12 | End: 2018-09-12

## 2018-09-12 RX ORDER — INSULIN LISPRO 100/ML
VIAL (ML) SUBCUTANEOUS EVERY 6 HOURS
Refills: 0 | Status: DISCONTINUED | OUTPATIENT
Start: 2018-09-12 | End: 2018-09-13

## 2018-09-12 RX ORDER — NIMODIPINE 60 MG/10ML
60 SOLUTION ORAL EVERY 4 HOURS
Refills: 0 | Status: DISCONTINUED | OUTPATIENT
Start: 2018-09-12 | End: 2018-09-12

## 2018-09-12 RX ADMIN — Medication 50 MILLIEQUIVALENT(S): at 06:30

## 2018-09-12 RX ADMIN — Medication 3.16 MICROGRAM(S)/KG/MIN: at 21:53

## 2018-09-12 RX ADMIN — Medication 250 MILLIGRAM(S): at 06:03

## 2018-09-12 RX ADMIN — Medication 25 MILLIEQUIVALENT(S): at 02:00

## 2018-09-12 RX ADMIN — Medication 3.16 MICROGRAM(S)/KG/MIN: at 19:00

## 2018-09-12 RX ADMIN — VASOPRESSIN 3.6 UNIT(S)/MIN: 20 INJECTION INTRAVENOUS at 19:00

## 2018-09-12 RX ADMIN — Medication 250 MILLIGRAM(S): at 18:28

## 2018-09-12 RX ADMIN — Medication 200 GRAM(S): at 05:42

## 2018-09-12 RX ADMIN — Medication 200 GRAM(S): at 10:10

## 2018-09-12 RX ADMIN — AMPICILLIN SODIUM AND SULBACTAM SODIUM 200 GRAM(S): 250; 125 INJECTION, POWDER, FOR SUSPENSION INTRAMUSCULAR; INTRAVENOUS at 12:56

## 2018-09-12 RX ADMIN — PHENYLEPHRINE HYDROCHLORIDE 5.05 MICROGRAM(S)/KG/MIN: 10 INJECTION INTRAVENOUS at 21:53

## 2018-09-12 RX ADMIN — Medication 50 MILLIGRAM(S): at 17:07

## 2018-09-12 RX ADMIN — Medication 650 MILLIGRAM(S): at 15:03

## 2018-09-12 RX ADMIN — Medication 500 GRAM(S): at 00:45

## 2018-09-12 RX ADMIN — Medication 50 MILLIEQUIVALENT(S): at 08:30

## 2018-09-12 RX ADMIN — Medication 1 DROP(S): at 21:54

## 2018-09-12 RX ADMIN — PHENYLEPHRINE HYDROCHLORIDE 5.05 MICROGRAM(S)/KG/MIN: 10 INJECTION INTRAVENOUS at 19:00

## 2018-09-12 RX ADMIN — VASOPRESSIN 3.6 UNIT(S)/MIN: 20 INJECTION INTRAVENOUS at 21:53

## 2018-09-12 RX ADMIN — Medication 50 MILLIGRAM(S): at 12:20

## 2018-09-12 RX ADMIN — AMPICILLIN SODIUM AND SULBACTAM SODIUM 200 GRAM(S): 250; 125 INJECTION, POWDER, FOR SUSPENSION INTRAMUSCULAR; INTRAVENOUS at 06:03

## 2018-09-12 RX ADMIN — INSULIN HUMAN 10 UNIT(S): 100 INJECTION, SOLUTION SUBCUTANEOUS at 07:41

## 2018-09-12 RX ADMIN — Medication 1000 GRAM(S): at 12:20

## 2018-09-12 RX ADMIN — AMPICILLIN SODIUM AND SULBACTAM SODIUM 200 GRAM(S): 250; 125 INJECTION, POWDER, FOR SUSPENSION INTRAMUSCULAR; INTRAVENOUS at 17:55

## 2018-09-12 RX ADMIN — LEVETIRACETAM 400 MILLIGRAM(S): 250 TABLET, FILM COATED ORAL at 17:08

## 2018-09-12 RX ADMIN — Medication 20 MILLIGRAM(S): at 10:01

## 2018-09-12 RX ADMIN — Medication 0: at 20:10

## 2018-09-12 RX ADMIN — Medication 50 MILLILITER(S): at 07:02

## 2018-09-12 RX ADMIN — MILRINONE LACTATE 2.53 MICROGRAM(S)/KG/MIN: 1 INJECTION, SOLUTION INTRAVENOUS at 19:00

## 2018-09-12 RX ADMIN — Medication 200 GRAM(S): at 21:53

## 2018-09-12 RX ADMIN — SODIUM CHLORIDE 1000 MILLILITER(S): 9 INJECTION INTRAMUSCULAR; INTRAVENOUS; SUBCUTANEOUS at 00:45

## 2018-09-12 RX ADMIN — PANTOPRAZOLE SODIUM 40 MILLIGRAM(S): 20 TABLET, DELAYED RELEASE ORAL at 12:20

## 2018-09-12 RX ADMIN — MILRINONE LACTATE 2.53 MICROGRAM(S)/KG/MIN: 1 INJECTION, SOLUTION INTRAVENOUS at 21:54

## 2018-09-12 RX ADMIN — LEVETIRACETAM 400 MILLIGRAM(S): 250 TABLET, FILM COATED ORAL at 01:00

## 2018-09-12 RX ADMIN — Medication 50 MILLIEQUIVALENT(S): at 16:45

## 2018-09-12 RX ADMIN — SODIUM CHLORIDE 2000 MILLILITER(S): 9 INJECTION INTRAMUSCULAR; INTRAVENOUS; SUBCUTANEOUS at 01:30

## 2018-09-12 NOTE — PROGRESS NOTE ADULT - SUBJECTIVE AND OBJECTIVE BOX
Visit Summary: Patient seen and evaluated at bedside.    Overnight Events: patient hemodynamically unstable     Exam:  T(C): 36.6 (09-11-18 @ 21:30), Max: 36.6 (09-11-18 @ 21:30)  HR: 127 (09-12-18 @ 02:45) (68 - 147)  BP: 118/100 (09-12-18 @ 02:30) (62/41 - 199/108)  RR: 18 (09-12-18 @ 02:45) (12 - 51)  SpO2: 90% (09-12-18 @ 02:45) (82% - 99%)  Wt(kg): --    Pupils b/l 6 and fixed  no cough / gag / corneals / dolls  flaccid x4                          12.5   11.6  )-----------( 351      ( 12 Sep 2018 01:56 )             38.7     09-12    151<H>  |  125<H>  |  12  ----------------------------<  161<H>  4.3   |  18<L>  |  0.73    Ca    6.0<LL>      12 Sep 2018 01:56  Phos  3.1     09-12  Mg     1.4     09-12    TPro  6.9  /  Alb  3.2<L>  /  TBili  0.3  /  DBili  x   /  AST  58<H>  /  ALT  34  /  AlkPhos  65  09-11  PT/INR - ( 12 Sep 2018 01:56 )   PT: 13.5 sec;   INR: 1.24 ratio         PTT - ( 12 Sep 2018 01:56 )  PTT:29.2 sec

## 2018-09-12 NOTE — PROCEDURE NOTE - PROCEDURE
Central line placement  09/12/2018    Active  KIANNA <<-----Click on this checkbox to enter Procedure

## 2018-09-12 NOTE — CONSULT NOTE ADULT - ATTENDING COMMENTS
Patient seen and examined, data reviewed, imaging reviewed.  in breif, 22 year old woman with difuse SAH and seizures/coma now in ARDS post aspiration event.  She is on 100% Fio2, high PEEP. According to neurocritical care team she is likely brain dead, but too unstable for confirmatory tests.  while oxygenation could improve with proning, overall prognosis is grim.  No indication for more aggressive treatments if there is no hope for neurologic recovery.  Please reconsult as needed.

## 2018-09-12 NOTE — CONSULT NOTE ADULT - ASSESSMENT
25F with SAH and now in ARDS, b/l dense consolidations and cardiogenic shock from possible stress induced cardiomyopathy.      ARDS  - P/F ratio is 78. Plateau pressure was 29 with driving pressure of 11 and peak pressure of 36. Compliance calculated at 27 meaning poor lung compliance due to ARDS.  - May drop FIO2 to 90 and maintain other settings.  - Please repeat ABG in 30 mins.  - Patient not a candidate for ECMO or proning given poor neurological status.  - Recommend robust Lasix diuresis.  - Antibiotics for b/l dense PNA likely from aspiration during seizure.  - No bronchoscopy at this time.      Cardiomyopathy  - Patient on 3 pressors all maxed out with a MAP of 82. .  - Would recommend trial of Milrinone to help with very poor LV function.    Overall poor prognosis. 25F with SAH and now in ARDS, b/l dense consolidations and cardiogenic shock from possible stress induced cardiomyopathy.      ARDS  - P/F ratio is 78. Plateau pressure was 29 with driving pressure of 11 and peak pressure of 36. Compliance calculated at 27 meaning poor lung compliance due to ARDS.  - May drop FIO2 to 90 and maintain other settings.  - Please repeat ABG in 30 mins.  - Patient not a candidate for ECMO or proning given poor neurological status.  - Recommend robust Lasix diuresis.  - Antibiotics for b/l dense PNA likely from aspiration during seizure.  - No bronchoscopy at this time.      Cardiomyopathy  - Stress induced, neurogenic.  - Patient on 3 pressors all maxed out with a MAP of 82. .  - Would recommend trial of Milrinone to help with very poor LV function.    Overall poor prognosis.

## 2018-09-12 NOTE — DISCHARGE NOTE ADULT - NS AS DC STROKE ED MATERIALS
education information given to pt fiance/Stroke Education Booklet/Stroke Warning Signs and Symptoms/Call 911 for Stroke/Risk Factors for Stroke/Prescribed Medications/Need for Followup After Discharge

## 2018-09-12 NOTE — CONSULT NOTE ADULT - PROBLEM SELECTOR RECOMMENDATION 4
Met with patients mother, father, boyfriend, and extended family with [facility] Card[facility] .   Dr Reyes explained neurological devastation from bleed with CT scan demonstration.  It was clearly explained that any meaningful recovery is unrealistic.  Family is aware of tenuous medical situation and likelihood of additional cardiac arrest .  Patients mother, father and family  grieving appropriately.  Family very faithful and are hoping for a miracle but they are aware that patient will likely die from this event.  [employer] contacted.  [employer] on board for younger siblings.  Continue with all aggressive medical management per family.

## 2018-09-12 NOTE — DISCHARGE NOTE FOR THE EXPIRED PATIENT - HOSPITAL COURSE
SAH with devastating brain injury, prerequisites satisfied: euthermia temp>36 degrees marcos, normotension SBP> 100 mmhg, no sedatives or confounding drugs, no severe metabolic disturbances, neuro exam consistent with irreversible coma, loss of brainstem reflexes( corneal, gag, cough, pupil, oculocephalic, and oculovestibular ( cold caloric test)), could not do apnea given NE and vasopressin, nuclear imaging done and officially reported at 11: 36 am consistent with cerebral circulatory arrest.  Time of death 11:36 am  live on informed  Family informed with the help of palliative  No Taoist objections against brain death, however family refusing the death of their loved one, will give them some time, will involve legal and ethics, will support family emotionally.  Will continue to talk to family     [facility] Patient is a 33 year old female with no pmhx BIBEMS to [place] unconscious.  According to OSH and boyfriend, patient experienced WHOL while out to dinner and took Tylenol without relief.  Patient went to bed and boyfriend felt bed shaking to find patient unresponsive.  EMS called.  Patient Ambu bagged upon arrival to OSH with GCS 3t.  Patient transferred to [facility] for further management. CT head demonstrated diffuse MF4 SAH and CXR demonstrated ARDS. SAH with devastating brain injury, prerequisites satisfied: euthermia temp>36 degrees marcos, normotension SBP> 100 mmhg, no sedatives or confounding drugs, no severe metabolic disturbances, neuro exam consistent with irreversible coma, loss of brainstem reflexes( corneal, gag, cough, pupil, oculocephalic, and oculovestibular ( cold caloric test)), could not do apnea given NE and vasopressin, nuclear imaging done and officially reported at 11: 36 am consistent with cerebral circulatory arrest.

## 2018-09-12 NOTE — CONSULT NOTE ADULT - PROBLEM SELECTOR RECOMMENDATION 2
Likely neurogenic   Obtain official bedside TTE  continue with hemodynamic support
Significant ARDS  Pulmonary consult appreciated

## 2018-09-12 NOTE — PROCEDURE NOTE - PROCEDURE
<<-----Click on this checkbox to enter Procedure Arterial cannula insertion  09/12/2018    Active  JOANNA

## 2018-09-12 NOTE — PROGRESS NOTE ADULT - ASSESSMENT
ASSESSMENT/PLAN:  23 yo F with unremarkable PMH, presented with SAH HH5, mF4. Unknown source.  Cerebral edema, possible anoxic cerebral injury.  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  Neurogenic cardiomyopathy with depressed LV function.  S/p cardiac arrest - PEA with ROSC 2 min.  Neurogenic pulmonary edema vs aspiration PNA. ?ARDS    NEURO:  neurochecks q1hr  off sedation, PRN Fentanyl  continue Keppra  s/p Mannitol 100 IV  repeat CTh in AM,   normalize parameters for formal exam as pt is likely brain dead  Activity: [] mobilize as tolerated [x] Bedrest [] PT [] OT [] PMNR    PULM: intubated, ARDS protocol  Keep vented    CV:  MAP>65  On max Jaya, VAs 0.04 + Levo - tachycardic but needs ittried Dobutamine in view of decreased LV function - became more hypotensive  Trend trops, EKG  Official ECHO    RENAL:   Ck WNL, correct hyperK    GI:  Diet: NPO  GI prophylaxis [] not indicated [x] PPI [] other:  Bowel regimen [x] colace [x] senna [] other:    ENDO:   Goal euglycemia (-180)    HEME/ONC:  VTE prophylaxis: [x] SCDs [x] chemoprophylaxis SQL    ID: Vanco + Unasyn for possible aspiration PNA    SOCIAL/FAMILY:  [] awaiting [x] updated at bedside [] family meeting    CODE STATUS:  [x] Full Code [] DNR [] DNI [] Palliative/Comfort Care    DISPOSITION:  [x] ICU [] Stroke Unit [] Floor [] EMU [] RCU [] PCU    [x] Patient is at high risk of neurologic deterioration/death due to: cerebral herniation.      Time spent: 60 critical care minutes    Contact: [phone]

## 2018-09-12 NOTE — PROGRESS NOTE ADULT - SUBJECTIVE AND OBJECTIVE BOX
HPI:  34 yo F with no pmhx BIBEMS to [place] unconscious.  According to OSH and boyfriend, patient experienced WHOL while out to dinner and took Tylenol without relief.  Patient went to bed and boyfriend felt bed shaking to find patient unresponsive.  EMS called.  Patient Ambu bagged upon arrival to OSH with GCS 3t.  Patient transferred to [facility] for further management. CT head demonstrated diffuse MF4 SAH and CXR demonstrated ARDS. (12 Sep 2018 02:02)    On admission, the patient was:  GCS: 3t.  Hunt-Chahal: 5  modified Epstein: 4    VITALS:  T(C): , Max: 36.6 (09-11-18 @ 21:30)  HR:  (68 - 149)  BP:  (62/41 - 199/108)  RR:  (12 - 52)  SpO2:  (82% - 99%)  Device: Avea, Mode: AC/ CMV (Assist Control/ Continuous Mandatory Ventilation), RR (machine): 24, TV (machine): 350, FiO2: 100, PEEP: 15, ITime: 1, MAP: 20, PIP: 28    LABS:  Na: 152 (09-12 @ 05:22), 151 (09-12 @ 01:56), 142 (09-11 @ 21:46)  K: 6.8 (09-12 @ 05:22), 4.3 (09-12 @ 01:56), 3.6 (09-11 @ 21:46)  Cl: 123 (09-12 @ 05:22), 125 (09-12 @ 01:56), 108 (09-11 @ 21:46)  CO2: 23 (09-12 @ 05:22), 18 (09-12 @ 01:56), 21 (09-11 @ 21:46)  BUN: 11 (09-12 @ 05:22), 12 (09-12 @ 01:56), 12 (09-11 @ 21:46)  Cr: 0.78 (09-12 @ 05:22), 0.73 (09-12 @ 01:56), 1.13 (09-11 @ 21:46)  Glu: 216(09-12 @ 05:22), 161(09-12 @ 01:56), 136(09-11 @ 21:46)    Hgb: 15.1 (09-12 @ 05:22), 12.5 (09-12 @ 01:56), 13.9 (09-11 @ 21:46)  Hct: 46.4 (09-12 @ 05:22), 38.7 (09-12 @ 01:56), 43.1 (09-11 @ 21:46)  WBC: 30.3 (09-12 @ 05:22), 11.6 (09-12 @ 01:56), 14.42 (09-11 @ 21:46)  Plt: 473 (09-12 @ 05:22), 351 (09-12 @ 01:56), 477 (09-11 @ 21:46)  PT: 13.5 (09-12 @ 01:56)  INR: 1.24 (09-12 @ 01:56)  aPTT: 29.2 (09-12 @ 01:56), PT: 10.7 (09-11 @ 21:46)  INR: 0.98 (09-11 @ 21:46)  aPTT: 28.8 (09-11 @ 21:46)    IMAGING:   Recent imaging studies were reviewed.    MEDICATIONS:  ampicillin/sulbactam  IVPB      ampicillin/sulbactam  IVPB 3 Gram(s) IV Intermittent every 6 hours  DOBUTamine Infusion 2.5 MICROgram(s)/kG/Min IV Continuous <Continuous>  influenza   Vaccine 0.5 milliLiter(s) IntraMuscular once  levETIRAcetam  IVPB 1000 milliGRAM(s) IV Intermittent every 12 hours  norepinephrine Infusion 0.05 MICROgram(s)/kG/Min IV Continuous <Continuous>  pantoprazole  Injectable 40 milliGRAM(s) IV Push daily  phenylephrine    Infusion 0.4 MICROgram(s)/kG/Min IV Continuous <Continuous>  sodium chloride 0.9%. 1000 milliLiter(s) IV Continuous <Continuous>  vancomycin  IVPB      vancomycin  IVPB 1000 milliGRAM(s) IV Intermittent every 12 hours  vasopressin Infusion 0.02 Unit(s)/Min IV Continuous <Continuous>    EXAMINATION:  HEENT:  3 mm pupils fixed, no dolls  Neuro:  no brainstem reflexes, no overbreating either, no motors.  Cards:  tachycardic  Respiratory:  intubated  Abdomen:  soft  Extremities:  no edema  Skin:  warm/dry

## 2018-09-12 NOTE — PROCEDURE NOTE - NSPOSTPRCRAD_GEN_A_CORE
18 cm/central line located in the superior vena cava/depth of insertion/no pneumothorax/post-procedure radiography performed

## 2018-09-12 NOTE — PROGRESS NOTE ADULT - ASSESSMENT
34 yo patient with no PMHx p/w SAH HH5 MF4.     - Supportive care per ICU  - GOC discussion with family

## 2018-09-12 NOTE — H&P ADULT - ASSESSMENT
32 yo patient with no PMHx p/w SAH HH5 MF4.     Neuro Q1  Cardene PRN  Possible EVD  GOC discussion with family

## 2018-09-12 NOTE — DISCHARGE NOTE ADULT - PATIENT PORTAL LINK FT
You can access the [facility] Patient Portal, offered by [facility], by registering with the following website: [email or web address]

## 2018-09-12 NOTE — CONSULT NOTE ADULT - PROBLEM SELECTOR RECOMMENDATION 9
Not a surgical candidate, HH5 MF4 s/p PEA arrest with ROSC  Poor prognosis may progress to brain death
Orders per NSCU team   continue with ventilatory and hemodynamic support   Discussed Hypothermia protocol with NSCU team.

## 2018-09-12 NOTE — PROVIDER CONTACT NOTE (CRITICAL VALUE NOTIFICATION) - ACTION/TREATMENT ORDERED:
FRANCISCO JAVIER Puvris to discuss with day team further management of care and update RN. continue to monitor

## 2018-09-12 NOTE — CONSULT NOTE ADULT - SUBJECTIVE AND OBJECTIVE BOX
HPI:  21 yo F with no pmhx BIBEMS to [place] unconscious.  According to OSH and boyfriend, patient experienced WHOL while out to dinner and took Tylenol without relief.  Patient went to bed and boyfriend felt bed shaking to find patient unresponsive.  EMS called.  Patient Ambu bagged upon arrival to OSH with GCS 3t.  Patient transferred to [facility] for further management. CT head demonstrated diffuse MF4 SAH and CXR demonstrated ARDS. (12 Sep 2018 02:02)    PERTINENT PM/SXH:   No pertinent past medical history    No significant past surgical history    FAMILY HISTORY:  No pertinent family history in first degree relatives    ITEMS NOT CHECKED ARE NOT PRESENT    SOCIAL HISTORY:   Significant other/partner:  [ ]  Children:  [ ]  Congregational/Spirituality:  Substance hx:  [ ]   Tobacco hx:  [ ]   Alcohol hx: [ ]   Home Opioid hx:  [ ] I-Stop Reference No:  Living Situation: [ x]Home  [ ]Long term care  [ ]Rehab [ ]Other    ADVANCE DIRECTIVES:    DNR  MOLST  [ ]  Living Will  [ ]   DECISION MAKER(s):  [ ] Health Care Proxy(s)  [x Surrogate(s)  [ ] Guardian           Name(s): Phone Number(s):  Mother : Tasha [phone]  Father:  Howard   Boyfriend:  Tiree   BASELINE (I)ADL(s) (prior to admission):  [place]: [x ]Total  [ ] Moderate [ ]Dependent    Allergies    No Known Allergies    Intolerances    MEDICATIONS  (STANDING):  ampicillin/sulbactam  IVPB      ampicillin/sulbactam  IVPB 3 Gram(s) IV Intermittent every 6 hours  hydrocortisone sodium succinate Injectable 50 milliGRAM(s) IV Push every 6 hours  influenza   Vaccine 0.5 milliLiter(s) IntraMuscular once  levETIRAcetam  IVPB 1000 milliGRAM(s) IV Intermittent every 12 hours  milrinone Infusion 0.125 MICROgram(s)/kG/Min (2.528 mL/Hr) IV Continuous <Continuous>  norepinephrine Infusion 0.05 MICROgram(s)/kG/Min (3.159 mL/Hr) IV Continuous <Continuous>  pantoprazole  Injectable 40 milliGRAM(s) IV Push daily  phenylephrine    Infusion 0.4 MICROgram(s)/kG/Min (5.055 mL/Hr) IV Continuous <Continuous>  sodium chloride 0.9%. 1000 milliLiter(s) (75 mL/Hr) IV Continuous <Continuous>  vancomycin  IVPB      vancomycin  IVPB 1000 milliGRAM(s) IV Intermittent every 12 hours  vasopressin Infusion 0.06 Unit(s)/Min (3.6 mL/Hr) IV Continuous <Continuous>    MEDICATIONS  (PRN):  acetaminophen    Suspension .. 650 milliGRAM(s) Oral every 6 hours PRN Temp greater or equal to 38C (100.4F), Mild Pain (1 - 3)    PRESENT SYMPTOMS: [x]Unable to obtain due to poor mentation   Source if other than patient:  [ ]Family   [ ]Team     Pain (Impact on QOL):    Location -         Minimal acceptable level (0-10 scale):                    Aggrevating factors -  Quality -  Radiation -  Severity (0-10 scale) -    Timing -    PAIN AD Score: 0    [email or web address] (press ctrl +  left click to view)    Dyspnea:                           [ ]Mild [ ]Moderate [ ]Severe  Anxiety:                             [ ]Mild [ ]Moderate [ ]Severe  Fatigue:                             [ ]Mild [ ]Moderate [ ]Severe  Nausea:                             [ ]Mild [ ]Moderate [ ]Severe  Loss of appetite:              [ ]Mild [ ]Moderate [ ]Severe  Constipation:                    [ ]Mild [ ]Moderate [ ]Severe    Other Symptoms:  [ ]All other review of systems negative     Karnofsky Performance Score/Palliative Performance Status Version 2:  <10      %  PHYSICAL EXAM:  Vital Signs Last 24 Hrs  T(C): 38.4 (12 Sep 2018 15:00), Max: 38.4 (12 Sep 2018 15:00)  T(F): 101.1 (12 Sep 2018 15:00), Max: 101.1 (12 Sep 2018 15:00)  HR: 150 (12 Sep 2018 15:00) (68 - 154)  BP: 101/68 (12 Sep 2018 15:00) (62/41 - 199/108)  BP(mean): 79 (12 Sep 2018 15:00) (50 - 122)  RR: 30 (12 Sep 2018 15:00) (12 - 52)  SpO2: 94% (12 Sep 2018 15:00) (75% - 99%) I&O's Summary    11 Sep 2018 07:01  -  12 Sep 2018 07:00  --------------------------------------------------------  IN: 3370.2 mL / OUT: 3100 mL / NET: 270.2 mL    12 Sep 2018 07:01  -  12 Sep 2018 15:12  --------------------------------------------------------  IN: 1449.9 mL / OUT: 1650 mL / NET: -200.1 mL    GENERAL:  [ ]Alert  [ ]Oriented x   [ ]Lethargic  [ ]Cachexia  [ x]Unarousable  [ ]Verbal  [ x]Non-Verbal  Behavioral:   [ ] Anxiety  [ ] Delirium [ ] Agitation [ ] Other  HEENT:  [ ]Normal   [ ]Dry mouth   [x ]ET Tube/Trach  [ ]Oral lesions  PULMONARY: Vented  [ ]Clear [ ]Tachypnea  [ ]Audible excessive secretions   [x]Rhonchi        [ ]Right [ ]Left [x]Bilateral  [ ]Crackles        [ ]Right [ ]Left [ ]Bilateral  [ ]Wheezing     [ ]Right [ ]Left [ ]Bilateral  CARDIOVASCULAR:    [ ]Regular [ x]Irregular [ x]Tachy  [ ]Kory [ ]Murmur [ ]Other  GASTROINTESTINAL:  [ x]Soft  [ ]Distended   [ ]+BS  [ ]Non tender [ ]Tender  [ ]PEG [ ]OGT/ NGT  Last BM:   GENITOURINARY:  [ ]Normal [ x Incontinent   [ ]Oliguria/Anuria   [ ]Cole  MUSCULOSKELETAL:   [ ]Normal   [ ]Weakness  x[ ]Bed/Wheelchair bound [ ]Edema  NEUROLOGIC:   [ ]No focal deficits  [x ] Cognitive impairment  [ ] Dysphagia [ ]Dysarthria [ ] Paresis [ ]Other   SKIN:   [ ]Normal   [ ]Pressure ulcer(s)  [ ]Rash    CRITICAL CARE:  [x ] Shock Present  [x ]Septic [ x]Cardiogenic [ x]Neurologic [x ]Hypovolemic  [ x]  Vasopressors [x ]  Inotropes   [x] Respiratory failure present  [ x] Acute  [ ] Chronic [x ] Hypoxic  [ x] Hypercarbic [ ] Other  [x ] Other organ failure     LABS:                        14.1   26.2  )-----------( 416      ( 12 Sep 2018 08:22 )             42.7   09-12    153<H>  |  124<H>  |  10  ----------------------------<  228<H>  4.6   |  19<L>  |  0.79    Ca    6.7<L>      12 Sep 2018 08:22  Phos  2.1     -12  Mg     1.7         TPro  6.9  /  Alb  3.2<L>  /  TBili  0.3  /  DBili  x   /  AST  58<H>  /  ALT  34  /  AlkPhos  65  -11  PT/INR - ( 12 Sep 2018 01:56 )   PT: 13.5 sec;   INR: 1.24 ratio         PTT - ( 12 Sep 2018 01:56 )  PTT:29.2 sec    Urinalysis Basic - ( 11 Sep 2018 21:46 )    Color: Yellow / Appearance: Clear / S.015 / pH: x  Gluc: x / Ketone: Negative  / Bili: Negative / Urobili: Negative mg/dL   Blood: x / Protein: 30 mg/dL / Nitrite: Negative   Leuk Esterase: Negative / RBC: 0-2 /HPF / WBC 3-5   Sq Epi: x / Non Sq Epi: Few / Bacteria: Few      RADIOLOGY & ADDITIONAL STUDIES:      INTERPRETATION:  2018 10:39 PM    CLINICAL HISTORY:  Unresponsiveness, seizure.    TECHNIQUE: Axial CT images are obtained from the cranial vertex to the   skullbase without the administration of IV contrast.    COMPARISON: None    FINDINGS:    There is diffuse subarachnoid hemorrhage throughout the cerebral sulci   and basal cisterns. There is a small amount of blood within the third and   fourth ventricles. Thin bilateral cerebral convexity subdural hematomas   are difficult to exclude due to beam hardening artifact. There is no   midline shift or ventriculomegaly.    Diffuse sulcal effacement is in part due to subarachnoid hemorrhage,   however cerebral edema may be possible as well. Early loss of gray-white   differentiation along the basal ganglia is also possible reason the   possibility of hypoxic ischemic brain injury. Tiny focus of air within   the right transverse sinus may be related to venous injection.    Air dissecting along the planes of the  spaces may be related   to barotrauma.    The calvarium is intact. Trace right maxillary sinus fluid. Otherwise the   paranasal sinuses and mastoid air cells are aerated.    IMPRESSION:    Extensive subarachnoid hemorrhage. Small volume intraventricular   hemorrhage. In the absence of trauma, subarachnoid hemorrhage secondary   to aneurysm rupture is possible. CTA recommended    Thin subdural hemorrhages are difficult to exclude due to beam hardening   artifact. Follow-up recommended    Diffuse sulcal effacement is in part due to subarachnoid hemorrhage,   however cerebral edema may be possible as well. Early loss of gray-white   differentiation along the basal ganglia is also possible raising the   possibility of hypoxic ischemic brain injury. This can be further   evaluated on a brain MRI.      PROTEIN CALORIE MALNUTRITION:   [ ] PPSV2 < or = to 30% [ ] significant weight loss  [ ] poor nutritional intake [ ] catabolic state [ ] anasarca     Albumin, Serum: 3.2 g/dL (18 @ 21:46)  Artificial Nutrition [ ]     REFERRALS:   [ x]Chaplaincy  [ ] Hospice  [x ]Child Life  x[ ]Social Work  [ ]Case management [ ]Holistic Therapy   Goals of Care Discussion Document:
CHIEF COMPLAINT: HA    HPI: 22F with no known PMH was at dinner and had a severe HA. She took Tylenol. Boyfriend was woken up by bed shaking and found patient with whole body shaking, convulsing. She was intubated in the field and brought to ED where she was found to have Subarachnoid hemorrhage. She was transferred to the NSCU. Sometime this early morning, she had profound hypoxia to 70's and had a code called. She arrested for 6 mins. Code note still in process of being written. Patient now on PRVC: RR-28, TV - 300, Peep - 18 and FIO2 of 1.0.  Gas on that is 7.18//58/78/21/-7.8/. This gas has improved hypoxemia and hypercarbia from prior ventilator settings.  The patient also has severe LV systolic dysfunction, diffuse B-lines and dense bilateral consolidations. No pericardial effusion. RV size is small and not enlarged compared to LV. Interventricular septum is not straight.      PAST MEDICAL & SURGICAL HISTORY:  No pertinent past medical history  No significant past surgical history      FAMILY HISTORY:  No pertinent family history in first degree relatives      SOCIAL HISTORY:  Smoking: [ ] Never Smoked [ ] Former Smoker (__ packs x ___ years) [ ] Current Smoker  (__ packs x ___ years)  Substance Use: [ ] Never Used [ ] Used ____  EtOH Use:  Marital Status: [ ] Single [ ]  [ ]  [ ]   Sexual History:   Occupation:  Recent Travel:  Country of Birth:  Advance Directives:    Allergies    No Known Allergies    Intolerances        HOME MEDICATIONS:    REVIEW OF SYSTEMS:    [x ] Unable to assess ROS because intubated, no mental status.    OBJECTIVE:  ICU Vital Signs Last 24 Hrs  T(C): 35.5 (12 Sep 2018 07:00), Max: 36.6 (11 Sep 2018 21:30)  T(F): 95.9 (12 Sep 2018 07:00), Max: 97.8 (11 Sep 2018 21:30)  HR: 133 (12 Sep 2018 08:30) (68 - 154)  BP: 119/87 (12 Sep 2018 08:00) (62/41 - 199/108)  BP(mean): 98 (12 Sep 2018 08:00) (50 - 122)  ABP: 122/86 (12 Sep 2018 08:30) (111/76 - 145/99)  ABP(mean): 97 (12 Sep 2018 08:30) (85 - 111)  RR: 28 (12 Sep 2018 08:30) (12 - 52)  SpO2: 95% (12 Sep 2018 08:30) (75% - 99%)    Mode: AC/ CMV (Assist Control/ Continuous Mandatory Ventilation), RR (machine): 24, TV (machine): 350, FiO2: 100, PEEP: 15, ITime: 1, MAP: 20, PIP: 28     @ 07: @ 07:00  --------------------------------------------------------  IN: 3370.2 mL / OUT: 3100 mL / NET: 270.2 mL     @ 07: @ 08:48  --------------------------------------------------------  IN: 192.1 mL / OUT: 225 mL / NET: -32.9 mL      CAPILLARY BLOOD GLUCOSE      POCT Blood Glucose.: 175 mg/dL (11 Sep 2018 20:57)      PHYSICAL EXAM:  General: NAD  HEENT: ETT in place  Lymph Nodes:  Neck:   Respiratory: ventilator sounds. B/l consolidations  Cardiovascular: RRR  Abdomen: S/NT/ND  Extremities: -C/C/E  Skin:   Neurological: GCS of 3T  Psychiatry:    HOSPITAL MEDICATIONS:    ampicillin/sulbactam  IVPB 3 Gram(s) IV Intermittent every 6 hours  ampicillin/sulbactam  IVPB      vancomycin  IVPB      vancomycin  IVPB 1000 milliGRAM(s) IV Intermittent every 12 hours    milrinone Infusion 0.125 MICROgram(s)/kG/Min IV Continuous <Continuous>  norepinephrine Infusion 0.05 MICROgram(s)/kG/Min IV Continuous <Continuous>  phenylephrine    Infusion 0.4 MICROgram(s)/kG/Min IV Continuous <Continuous>    vasopressin Infusion 0.06 Unit(s)/Min IV Continuous <Continuous>      levETIRAcetam  IVPB 1000 milliGRAM(s) IV Intermittent every 12 hours    pantoprazole  Injectable 40 milliGRAM(s) IV Push daily        calcium gluconate IVPB 2 Gram(s) IV Intermittent once  sodium chloride 0.9%. 1000 milliLiter(s) IV Continuous <Continuous>    influenza   Vaccine 0.5 milliLiter(s) IntraMuscular once          LABS:                        14.1   26.2  )-----------( 416      ( 12 Sep 2018 08:22 )             42.7     Hgb Trend: 14.1<--, 15.1<--, 12.5<--, 13.9<--      152<H>  |  123<H>  |  11  ----------------------------<  216<H>  6.8<HH>   |  23  |  0.78    Ca    6.3<LL>      12 Sep 2018 05:22  Phos  2.6       Mg     1.8         TPro  6.9  /  Alb  3.2<L>  /  TBili  0.3  /  DBili  x   /  AST  58<H>  /  ALT  34  /  AlkPhos  65      Creatinine Trend: 0.78<--, 0.73<--, 1.13<--  PT/INR - ( 12 Sep 2018 01:56 )   PT: 13.5 sec;   INR: 1.24 ratio         PTT - ( 12 Sep 2018 01:56 )  PTT:29.2 sec  Urinalysis Basic - ( 11 Sep 2018 21:46 )    Color: Yellow / Appearance: Clear / S.015 / pH: x  Gluc: x / Ketone: Negative  / Bili: Negative / Urobili: Negative mg/dL   Blood: x / Protein: 30 mg/dL / Nitrite: Negative   Leuk Esterase: Negative / RBC: 0-2 /HPF / WBC 3-5   Sq Epi: x / Non Sq Epi: Few / Bacteria: Few      Arterial Blood Gas:   @ 08:08  7.18/58/78/21/94/-7.8  ABG lactate: --  Arterial Blood Gas:   @ 05:24  7.16/72/67/24/88/-6.5  ABG lactate: --  Arterial Blood Gas:   @ 01:57  7.14/55/56/18/80/-11.6  ABG lactate: --  Arterial Blood Gas:   @ 22:30  --/46/71///-8.8  ABG lactate: --    Venous Blood Gas:   @ 03:36  7.13/61/45/  VBG Lactate: --      MICROBIOLOGY:     RADIOLOGY:  [ ] Reviewed and interpreted by me    PULMONARY FUNCTION TESTS:    EKG:
CHIEF COMPLAINT:  Tachycardia / Cardiomyopathy     HISTORY OF PRESENT ILLNESS: 2F with no known PMH was at dinner and had a severe HA. She took Tylenol. Boyfriend was woken up by bed shaking and found patient with whole body shaking, convulsing. She was intubated in the field and brought to ED where she was found to have Subarachnoid hemorrhage. She was transferred to the NSCU. Sometime this early morning, she had profound hypoxia to 70's and had a code called. She arrested for 6 mins. Code note still in process of being written. Patient now on PRVC: RR-28, TV - 300, Peep - 18 and FIO2 of 1.0.  Gas on that is 7.18//58/78/21/-7.8/23/94. This gas has improved hypoxemia and hypercarbia from prior ventilator settings.  Per NSCU bedside echo, the patient also has severe LV systolic dysfunction. There is diffuse B-lines and dense bilateral consolidations. No pericardial effusion.      PAST MEDICAL & SURGICAL HISTORY:  No pertinent past medical history  No significant past surgical history          MEDICATIONS:  furosemide   Injectable 20 milliGRAM(s) IV Push once  milrinone Infusion 0.125 MICROgram(s)/kG/Min IV Continuous <Continuous>  norepinephrine Infusion 0.05 MICROgram(s)/kG/Min IV Continuous <Continuous>  phenylephrine    Infusion 0.4 MICROgram(s)/kG/Min IV Continuous <Continuous>    ampicillin/sulbactam  IVPB 3 Gram(s) IV Intermittent every 6 hours  ampicillin/sulbactam  IVPB      vancomycin  IVPB      vancomycin  IVPB 1000 milliGRAM(s) IV Intermittent every 12 hours      levETIRAcetam  IVPB 1000 milliGRAM(s) IV Intermittent every 12 hours    pantoprazole  Injectable 40 milliGRAM(s) IV Push daily    vasopressin Infusion 0.06 Unit(s)/Min IV Continuous <Continuous>    calcium gluconate IVPB 2 Gram(s) IV Intermittent once  influenza   Vaccine 0.5 milliLiter(s) IntraMuscular once  sodium chloride 0.9%. 1000 milliLiter(s) IV Continuous <Continuous>      FAMILY HISTORY:  No pertinent family history in first degree relatives      SOCIAL HISTORY:    [ ] Non-smoker  [ ] Smoker  [ ] Alcohol    Allergies    No Known Allergies    Intolerances    	    REVIEW OF SYSTEMS:      [ ] All others negative	  [X ] Unable to obtain    PHYSICAL EXAM:  T(C): 35.5 (09-12-18 @ 07:00), Max: 36.6 (09-11-18 @ 21:30)  HR: 133 (09-12-18 @ 08:30) (68 - 154)  BP: 119/87 (09-12-18 @ 08:00) (62/41 - 199/108)  RR: 28 (09-12-18 @ 08:30) (12 - 52)  SpO2: 95% (09-12-18 @ 08:30) (75% - 99%)  Wt(kg): --  I&O's Summary    11 Sep 2018 07:01  -  12 Sep 2018 07:00  --------------------------------------------------------  IN: 3370.2 mL / OUT: 3100 mL / NET: 270.2 mL    12 Sep 2018 07:01  -  12 Sep 2018 09:12  --------------------------------------------------------  IN: 192.1 mL / OUT: 225 mL / NET: -32.9 mL        Appearance: Intubated	  HEENT:   dry  oral mucosa  Lymphatic: No lymphadenopathy  Cardiovascular: tachycardia  S1 S2, No JVD, No murmurs, No edema  Respiratory: ventilated 	  Psychiatry: A & O x 0, Mood & affect appropriate  Gastrointestinal:  Soft, Non-tender, + BS	  Skin: No rashes, No ecchymoses, No cyanosis	  Neurologic: unresponsive   Extremities: decreased  range of motion, No clubbing, cyanosis or edema  Vascular: Peripheral pulses palpable 2+ bilaterally    TELEMETRY: 	Sinus tachycardia     ECG:  	  RADIOLOGY:  < from: CT Brain Stroke Protocol (09.11.18 @ 22:22) >    EXAM:  CT BRAIN STROKE PROTOCOL                            PROCEDURE DATE:  09/11/2018          INTERPRETATION:  9/11/2018 10:39 PM    CLINICAL HISTORY:  Unresponsiveness, seizure.    TECHNIQUE: Axial CT images are obtained from the cranial vertex to the   skullbase without the administration of IV contrast.    COMPARISON: None    FINDINGS:    There is diffuse subarachnoid hemorrhage throughout the cerebral sulci   and basal cisterns. There is a small amount of blood within the third and   fourth ventricles. Thin bilateral cerebral convexity subdural hematomas   are difficult to exclude due to beam hardening artifact. There is no   midline shift or ventriculomegaly.    Diffuse sulcal effacement is in part due to subarachnoid hemorrhage,   however cerebral edema may be possible as well. Early loss of gray-white   differentiation along the basal ganglia is also possible reason the   possibility of hypoxic ischemic brain injury. Tiny focus of air within   the right transverse sinus may be related to venous injection.    Air dissecting along the planes of the  spaces may be related   to barotrauma.    The calvarium is intact. Trace right maxillary sinus fluid. Otherwise the   paranasal sinuses and mastoid air cells are aerated.    IMPRESSION:    Extensive subarachnoid hemorrhage. Small volume intraventricular   hemorrhage. In the absence of trauma, subarachnoid hemorrhage secondary   to aneurysm rupture is possible. CTA recommended    Thin subdural hemorrhages are difficult to exclude due to beam hardening   artifact. Follow-up recommended    Diffuse sulcal effacement is in part due to subarachnoid hemorrhage,   however cerebral edema may be possible as well. Early loss of gray-white   differentiation along the basal ganglia is also possible raising the   possibility of hypoxic ischemic brain injury. This can be further   evaluated on a brain MRI.    Discussed with Dr. Barker at 10:40 PM                CHEY ORTEGA M.D., ATTENDING RADIOLOGIST  This document has been electronically signed. Sep 11 2018 10:47PM                    OTHER: 	  	  LABS:	 	    CARDIAC MARKERS:  Troponin I, Serum: .100 ng/mL (09-11 @ 21:46)                                  14.1   26.2  )-----------( 416      ( 12 Sep 2018 08:22 )             42.7     09-12    x   |  x   |  10  ----------------------------<  228<H>  x    |  19<L>  |  0.79    Ca    6.7<L>      12 Sep 2018 08:22  Phos  2.1     09-12  Mg     1.7     09-12    TPro  6.9  /  Alb  3.2<L>  /  TBili  0.3  /  DBili  x   /  AST  58<H>  /  ALT  34  /  AlkPhos  65  09-11    proBNP:   Lipid Profile:   HgA1c:   TSH:

## 2018-09-12 NOTE — PROGRESS NOTE ADULT - SUBJECTIVE AND OBJECTIVE BOX
SUMMARY:  22 year-old woman with no known medical problems developed worst headache of life whilst at dinner on 9/11/18. Later that evening, she was found seizing in bed by her boyfriend. She was taken to [facility] where she was reported GCS 3T. Imaging revealed diffuse subarachnoid hemorrhage. She was reported to be hypoxic at OSH. [facility] was noted to have profound hypotension requiring quadruple pressor support (neosynephrine, vasopressin, norepinephrine and dobutamine). She was severely hypoxic and appeared to be in ARDS. She sustained a 2 minute PEA arrest and resuscitated with chest compressions and 1 dose of epinephrine.    24 HOUR EVENTS:  Bedside ultrasound with apical ballooning, so dobutamine switched to milrinone. Multiple vent setting changes to maintain oxygenation. Cardiology and Pulmonary Critical Care consults called, who agreed that patient receiving maximal therapy. NSGY declined any surgical intervention given devastating neurologic injury. EEG flat. TCDs with poor windows.    ADMISSION SCORES:  HH5 mF4    VITALS/DATA/ORDERS: [x] Reviewed    EXAMINATION:  General: No sedation, no spontaneous movement  HEENT: Anicteric sclerae  Cardiac: [id number], tachy  Lungs: Coarse, rhonchorous  Abdomen: Soft, non-tender, +BS  Extremities: No c/c/e  Skin/Incision Site: Clean, dry and intact  Neurologic: Fixed dilated pupils, absent corneal reflex, absent vestibuloocular reflexes, absent cough/gag, flaccid quadriplegia, not overbreathing vent set rate (however set rate high given hypercapnia)

## 2018-09-12 NOTE — PROGRESS NOTE ADULT - ASSESSMENT
ASSESSMENT/PLAN:  HH5 mF4 subarachnoid hemorrhage, post-bleed day 1 with stunned myocardium, neurogenic pulmonary edema, possible aspiration pneumonitis, ARDS and multifactorial shock (cardiac, possibly septic)    NEURO:  Seizure Prophylaxis: Levetiracetam   Delayed Cerebral Ischemia Management: Serial screening TCDs, euvolemia as medically tolerable, nimodipine held given profound hypotension  No neurosurgical intervention offered  Patient likely brain dead, but unable to perform apnea test given acidemia and unstable respiratory status; consider nuclear medicine scan if able to transport safely  Goals of care discussion  Palliative Care consult appreciated  Activity: [] OOB as tolerated [x] Bedrest [] PT [] OT [] PMNR    PULM:  ARDS: full mechanical support; low tidal volume, higher PEEP, consider nitrous oxide  Patient not a candidate for ECMO given inability to anticoagulate in setting of intracranial hemorrhage    CV:  MAP>65mmHg with pressor support  Neurogenic stunned myocardium: milrinone  Try to wean down norepinephrine to decrease tachycardia and increased cardiac filling time  Stress dose steroids for refractory hypotension in setting of possible sepsis (aspiration pneumonitis)    RENAL:  Fluids: IVL given pulmonary edema    GI:  Diet: Trickle feeds  GI prophylaxis [] not indicated [x] PPI [] other:  Bowel regimen [x] colace [x] senna [] other:  Check LFTs given arrest s/o shock liver    ENDO:   Goal euglycemia (-180)  Insulin gtt is required    HEME/ONC:  Check coags given recent arrest  VTE prophylaxis: [x] SCDs [] chemoprophylaxis [x] hold chemoprophylaxis due to: subarachnoid hemorrhage [] high risk of DVT/PE on admission due to:    ID:  Monitor for fever  Empiric aspiration coverage    SOCIAL/FAMILY:  [] awaiting [x] updated at bedside [] family meeting  Spoke with patient's mother father, aunt and boyfriend with Social Work and Palliative Care. Explained clinical course and reviewed data including CT scan. Discussed maximal medical therapy being provided, but patient in gravely critical state and could pass away despite our best efforts. Family requested on-going full aggressive medical care.    CODE STATUS:  [x] Full Code [] DNR [] DNI [] Palliative/Comfort Care    DISPOSITION:  [x] ICU [] Stroke Unit [] Floor [] EMU [] RCU [] PCU    [x] Patient is at high risk of neurologic deterioration/death due to: shock    Time spent: 90 [x] critical care minutes

## 2018-09-12 NOTE — CONSULT NOTE ADULT - ASSESSMENT
21 yo female admitted to NSCU with diffuse SAH. Now intubated and found to be tachycardia/hypotensive with bedside TTE revealing global LV systolic dysfunction.

## 2018-09-12 NOTE — H&P ADULT - NSHPPHYSICALEXAM_GEN_ALL_CORE
Patient Intubated, Pupil 5mm fixed, no cough, no gag, no corneals, no dolls, not over breathing ventilator. Flaccid x 4 extremeties.

## 2018-09-13 LAB
ALBUMIN SERPL ELPH-MCNC: 2.6 G/DL — LOW (ref 3.3–5)
ALP SERPL-CCNC: 47 U/L — SIGNIFICANT CHANGE UP (ref 40–120)
ALT FLD-CCNC: 41 U/L — SIGNIFICANT CHANGE UP (ref 10–45)
ANION GAP SERPL CALC-SCNC: 7 MMOL/L — SIGNIFICANT CHANGE UP (ref 5–17)
ANION GAP SERPL CALC-SCNC: 9 MMOL/L — SIGNIFICANT CHANGE UP (ref 5–17)
AST SERPL-CCNC: 86 U/L — HIGH (ref 10–40)
BASE EXCESS BLDA CALC-SCNC: 0.5 MMOL/L — SIGNIFICANT CHANGE UP (ref -2–2)
BASE EXCESS BLDA CALC-SCNC: 1.2 MMOL/L — SIGNIFICANT CHANGE UP (ref -2–2)
BILIRUB SERPL-MCNC: 0.4 MG/DL — SIGNIFICANT CHANGE UP (ref 0.2–1.2)
BUN SERPL-MCNC: 12 MG/DL — SIGNIFICANT CHANGE UP (ref 7–23)
BUN SERPL-MCNC: 15 MG/DL — SIGNIFICANT CHANGE UP (ref 7–23)
CALCIUM SERPL-MCNC: 8 MG/DL — LOW (ref 8.4–10.5)
CALCIUM SERPL-MCNC: 8.2 MG/DL — LOW (ref 8.4–10.5)
CHLORIDE SERPL-SCNC: 121 MMOL/L — HIGH (ref 96–108)
CHLORIDE SERPL-SCNC: 122 MMOL/L — HIGH (ref 96–108)
CO2 BLDA-SCNC: 30 MMOL/L — SIGNIFICANT CHANGE UP (ref 22–30)
CO2 BLDA-SCNC: 31 MMOL/L — HIGH (ref 22–30)
CO2 SERPL-SCNC: 25 MMOL/L — SIGNIFICANT CHANGE UP (ref 22–31)
CO2 SERPL-SCNC: 26 MMOL/L — SIGNIFICANT CHANGE UP (ref 22–31)
CREAT SERPL-MCNC: 0.82 MG/DL — SIGNIFICANT CHANGE UP (ref 0.5–1.3)
CREAT SERPL-MCNC: 0.86 MG/DL — SIGNIFICANT CHANGE UP (ref 0.5–1.3)
GAS PNL BLDA: SIGNIFICANT CHANGE UP
GLUCOSE BLDC GLUCOMTR-MCNC: 112 MG/DL — HIGH (ref 70–99)
GLUCOSE BLDC GLUCOMTR-MCNC: 131 MG/DL — HIGH (ref 70–99)
GLUCOSE BLDC GLUCOMTR-MCNC: 133 MG/DL — HIGH (ref 70–99)
GLUCOSE SERPL-MCNC: 147 MG/DL — HIGH (ref 70–99)
GLUCOSE SERPL-MCNC: 171 MG/DL — HIGH (ref 70–99)
HCO3 BLDA-SCNC: 28 MMOL/L — SIGNIFICANT CHANGE UP (ref 21–29)
HCO3 BLDA-SCNC: 29 MMOL/L — SIGNIFICANT CHANGE UP (ref 21–29)
HCT VFR BLD CALC: 32.9 % — LOW (ref 34.5–45)
HGB BLD-MCNC: 10.5 G/DL — LOW (ref 11.5–15.5)
HOROWITZ INDEX BLDA+IHG-RTO: 90 — SIGNIFICANT CHANGE UP
HOROWITZ INDEX BLDA+IHG-RTO: 90 — SIGNIFICANT CHANGE UP
MAGNESIUM SERPL-MCNC: 2 MG/DL — SIGNIFICANT CHANGE UP (ref 1.6–2.6)
MCHC RBC-ENTMCNC: 30 PG — SIGNIFICANT CHANGE UP (ref 27–34)
MCHC RBC-ENTMCNC: 31.9 GM/DL — LOW (ref 32–36)
MCV RBC AUTO: 94 FL — SIGNIFICANT CHANGE UP (ref 80–100)
OSMOLALITY SERPL: 317 MOS/KG — HIGH (ref 275–300)
OSMOLALITY SERPL: 326 MOS/KG — HIGH (ref 275–300)
PCO2 BLDA: 63 MMHG — HIGH (ref 32–46)
PCO2 BLDA: 64 MMHG — HIGH (ref 32–46)
PH BLDA: 7.27 — LOW (ref 7.35–7.45)
PH BLDA: 7.28 — LOW (ref 7.35–7.45)
PHOSPHATE SERPL-MCNC: 0.9 MG/DL — CRITICAL LOW (ref 2.5–4.5)
PLATELET # BLD AUTO: 213 K/UL — SIGNIFICANT CHANGE UP (ref 150–400)
PO2 BLDA: 107 MMHG — SIGNIFICANT CHANGE UP (ref 74–108)
PO2 BLDA: 184 MMHG — HIGH (ref 74–108)
POTASSIUM SERPL-MCNC: 3.8 MMOL/L — SIGNIFICANT CHANGE UP (ref 3.5–5.3)
POTASSIUM SERPL-MCNC: 4.6 MMOL/L — SIGNIFICANT CHANGE UP (ref 3.5–5.3)
POTASSIUM SERPL-SCNC: 3.8 MMOL/L — SIGNIFICANT CHANGE UP (ref 3.5–5.3)
POTASSIUM SERPL-SCNC: 4.6 MMOL/L — SIGNIFICANT CHANGE UP (ref 3.5–5.3)
PROT SERPL-MCNC: 5 G/DL — LOW (ref 6–8.3)
RBC # BLD: 3.5 M/UL — LOW (ref 3.8–5.2)
RBC # FLD: 13.1 % — SIGNIFICANT CHANGE UP (ref 10.3–14.5)
SAO2 % BLDA: 98 % — HIGH (ref 92–96)
SAO2 % BLDA: 99 % — HIGH (ref 92–96)
SODIUM SERPL-SCNC: 153 MMOL/L — HIGH (ref 135–145)
SODIUM SERPL-SCNC: 157 MMOL/L — HIGH (ref 135–145)
WBC # BLD: 20.3 K/UL — HIGH (ref 3.8–10.5)
WBC # FLD AUTO: 20.3 K/UL — HIGH (ref 3.8–10.5)

## 2018-09-13 PROCEDURE — 99232 SBSQ HOSP IP/OBS MODERATE 35: CPT

## 2018-09-13 PROCEDURE — 99291 CRITICAL CARE FIRST HOUR: CPT

## 2018-09-13 RX ORDER — POTASSIUM PHOSPHATE, MONOBASIC POTASSIUM PHOSPHATE, DIBASIC 236; 224 MG/ML; MG/ML
30 INJECTION, SOLUTION INTRAVENOUS ONCE
Refills: 0 | Status: COMPLETED | OUTPATIENT
Start: 2018-09-13 | End: 2018-09-13

## 2018-09-13 RX ORDER — VASOPRESSIN 20 [USP'U]/ML
0.03 INJECTION INTRAVENOUS
Qty: 100 | Refills: 0 | Status: DISCONTINUED | OUTPATIENT
Start: 2018-09-13 | End: 2018-09-14

## 2018-09-13 RX ORDER — MAGNESIUM SULFATE 500 MG/ML
1 VIAL (ML) INJECTION ONCE
Refills: 0 | Status: COMPLETED | OUTPATIENT
Start: 2018-09-13 | End: 2018-09-13

## 2018-09-13 RX ORDER — VASOPRESSIN 20 [USP'U]/ML
0.04 INJECTION INTRAVENOUS
Qty: 100 | Refills: 0 | Status: DISCONTINUED | OUTPATIENT
Start: 2018-09-13 | End: 2018-09-13

## 2018-09-13 RX ORDER — CHLORHEXIDINE GLUCONATE 213 G/1000ML
15 SOLUTION TOPICAL
Refills: 0 | Status: DISCONTINUED | OUTPATIENT
Start: 2018-09-13 | End: 2018-09-17

## 2018-09-13 RX ORDER — MILRINONE LACTATE 1 MG/ML
0.25 INJECTION, SOLUTION INTRAVENOUS
Qty: 20 | Refills: 0 | Status: DISCONTINUED | OUTPATIENT
Start: 2018-09-13 | End: 2018-09-16

## 2018-09-13 RX ADMIN — Medication 50 MILLIGRAM(S): at 05:36

## 2018-09-13 RX ADMIN — MILRINONE LACTATE 5.05 MICROGRAM(S)/KG/MIN: 1 INJECTION, SOLUTION INTRAVENOUS at 19:00

## 2018-09-13 RX ADMIN — AMPICILLIN SODIUM AND SULBACTAM SODIUM 200 GRAM(S): 250; 125 INJECTION, POWDER, FOR SUSPENSION INTRAMUSCULAR; INTRAVENOUS at 12:09

## 2018-09-13 RX ADMIN — POTASSIUM PHOSPHATE, MONOBASIC POTASSIUM PHOSPHATE, DIBASIC 83.33 MILLIMOLE(S): 236; 224 INJECTION, SOLUTION INTRAVENOUS at 23:48

## 2018-09-13 RX ADMIN — Medication 250 MILLIGRAM(S): at 05:37

## 2018-09-13 RX ADMIN — AMPICILLIN SODIUM AND SULBACTAM SODIUM 200 GRAM(S): 250; 125 INJECTION, POWDER, FOR SUSPENSION INTRAMUSCULAR; INTRAVENOUS at 05:37

## 2018-09-13 RX ADMIN — PANTOPRAZOLE SODIUM 40 MILLIGRAM(S): 20 TABLET, DELAYED RELEASE ORAL at 12:09

## 2018-09-13 RX ADMIN — AMPICILLIN SODIUM AND SULBACTAM SODIUM 200 GRAM(S): 250; 125 INJECTION, POWDER, FOR SUSPENSION INTRAMUSCULAR; INTRAVENOUS at 00:02

## 2018-09-13 RX ADMIN — Medication 1 DROP(S): at 21:41

## 2018-09-13 RX ADMIN — LEVETIRACETAM 1000 MILLIGRAM(S): 250 TABLET, FILM COATED ORAL at 05:36

## 2018-09-13 RX ADMIN — LEVETIRACETAM 1000 MILLIGRAM(S): 250 TABLET, FILM COATED ORAL at 17:23

## 2018-09-13 RX ADMIN — PHENYLEPHRINE HYDROCHLORIDE 5.05 MICROGRAM(S)/KG/MIN: 10 INJECTION INTRAVENOUS at 19:00

## 2018-09-13 RX ADMIN — Medication 50 MILLIGRAM(S): at 00:01

## 2018-09-13 RX ADMIN — AMPICILLIN SODIUM AND SULBACTAM SODIUM 200 GRAM(S): 250; 125 INJECTION, POWDER, FOR SUSPENSION INTRAMUSCULAR; INTRAVENOUS at 23:48

## 2018-09-13 RX ADMIN — Medication 1 DROP(S): at 05:37

## 2018-09-13 RX ADMIN — CHLORHEXIDINE GLUCONATE 15 MILLILITER(S): 213 SOLUTION TOPICAL at 17:23

## 2018-09-13 RX ADMIN — VASOPRESSIN 3.6 UNIT(S)/MIN: 20 INJECTION INTRAVENOUS at 19:00

## 2018-09-13 RX ADMIN — AMPICILLIN SODIUM AND SULBACTAM SODIUM 200 GRAM(S): 250; 125 INJECTION, POWDER, FOR SUSPENSION INTRAMUSCULAR; INTRAVENOUS at 17:23

## 2018-09-13 RX ADMIN — Medication 100 GRAM(S): at 01:27

## 2018-09-13 RX ADMIN — Medication 1 DROP(S): at 13:28

## 2018-09-13 RX ADMIN — Medication 3.16 MICROGRAM(S)/KG/MIN: at 19:00

## 2018-09-13 NOTE — PROGRESS NOTE ADULT - SUBJECTIVE AND OBJECTIVE BOX
Subjective: Patient seen and examined. No new events except as noted.   status quo   FIO2 at 40% with PEEP 16   father at bedside     REVIEW OF SYSTEMS:    Unable to obtain     MEDICATIONS:  MEDICATIONS  (STANDING):  ampicillin/sulbactam  IVPB      ampicillin/sulbactam  IVPB 3 Gram(s) IV Intermittent every 6 hours  artificial tears (preservative free) Ophthalmic Solution 1 Drop(s) Both EYES every 8 hours  chlorhexidine 0.12% Liquid 15 milliLiter(s) Swish and Spit two times a day  hydrocortisone sodium succinate Injectable 50 milliGRAM(s) IV Push every 6 hours  influenza   Vaccine 0.5 milliLiter(s) IntraMuscular once  insulin lispro (HumaLOG) corrective regimen sliding scale   SubCutaneous every 6 hours  levETIRAcetam 1000 milliGRAM(s) Oral two times a day  milrinone Infusion 0.25 MICROgram(s)/kG/Min (5.055 mL/Hr) IV Continuous <Continuous>  norepinephrine Infusion 0.05 MICROgram(s)/kG/Min (3.159 mL/Hr) IV Continuous <Continuous>  pantoprazole  Injectable 40 milliGRAM(s) IV Push daily  phenylephrine    Infusion 0.4 MICROgram(s)/kG/Min (5.055 mL/Hr) IV Continuous <Continuous>  vancomycin  IVPB      vancomycin  IVPB 1000 milliGRAM(s) IV Intermittent every 12 hours  vasopressin Infusion 0.06 Unit(s)/Min (3.6 mL/Hr) IV Continuous <Continuous>      PHYSICAL EXAM:  T(C): 36.7 (09-13-18 @ 10:00), Max: 38.4 (09-12-18 @ 15:00)  HR: 117 (09-13-18 @ 10:15) (117 - 152)  BP: 150/123 (09-12-18 @ 17:00) (100/67 - 150/123)  RR: 30 (09-13-18 @ 10:15) (12 - 30)  SpO2: 99% (09-13-18 @ 10:15) (86% - 100%)  Wt(kg): --  I&O's Summary    12 Sep 2018 07:01  -  13 Sep 2018 07:00  --------------------------------------------------------  IN: 4559.9 mL / OUT: 3075 mL / NET: 1484.9 mL    13 Sep 2018 07:01  -  13 Sep 2018 10:57  --------------------------------------------------------  IN: 233.7 mL / OUT: 250 mL / NET: -16.3 mL          Appearance: Intubated	  HEENT:   dry  oral mucosa  Lymphatic: No lymphadenopathy  Cardiovascular: tachycardia  S1 S2, No JVD, No murmurs, No edema  Respiratory: ventilated 	  Psychiatry: A & O x 0, Mood & affect appropriate  Gastrointestinal:  Soft, Non-tender, + BS	  Skin: No rashes, No ecchymoses, No cyanosis	  Neurologic: unresponsive   Extremities: decreased  range of motion, No clubbing, cyanosis or edema  Vascular: Peripheral pulses palpable 2+ bilaterally    LABS:    CARDIAC MARKERS:  CARDIAC MARKERS ( 11 Sep 2018 21:46 )  .100 ng/mL / x     / 117 U/L / x     / 1.4 ng/mL                                13.0   25.4  )-----------( 367      ( 12 Sep 2018 19:56 )             42.2     09-13    157<H>  |  122<H>  |  12  ----------------------------<  171<H>  4.6   |  26  |  0.86    Ca    8.0<L>      13 Sep 2018 03:54  Phos  4.4     09-12  Mg     1.8     09-12    TPro  5.1<L>  /  Alb  2.6<L>  /  TBili  0.1<L>  /  DBili  x   /  AST  66<H>  /  ALT  53<H>  /  AlkPhos  48  09-12    proBNP:   Lipid Profile:   HgA1c: Hemoglobin A1C, Whole Blood: 5.2 % (09-12 @ 18:14)    TSH:     2  Negative          TELEMETRY: 	Sinus tach     ECG:  	  RADIOLOGY:    DIAGNOSTIC TESTING:  [ ] Echocardiogram:  [ ]  Catheterization:  [ ] Stress Test:    OTHER:

## 2018-09-13 NOTE — ED PROCEDURE NOTE - CPROC ED TRACHE INTUB DETAIL1
Patient was pre-oxygenated. An endotracheal tube (ETT) was placed through the vocal cords into the trachea.  ETT position was confirmed by auscultation of bilateral breath sounds to all lung fields. ETCO2 level was appropriate./Patient connected to ventilator with settings as ordered./During intubation, applied gentle pressure to the cricoid cartilage.

## 2018-09-13 NOTE — PROVIDER CONTACT NOTE (CRITICAL VALUE NOTIFICATION) - TEST AND RESULT REPORTED:
ABG, pH 7.14, pCO2 71. Venous gas pH 7.09
Troponin 1029, Potassium 6.8, Calcium 6.3
ph 7.16  pco2 75
ABG Results
pH 7.14
VBG pH
ABG
ABG pH 7.20, pCO2 75
Calcium 6.0
Phosphorus 0.9
ABG

## 2018-09-13 NOTE — PROVIDER CONTACT NOTE (CRITICAL VALUE NOTIFICATION) - NAME OF MD/NP/PA/DO NOTIFIED:
PA Nick Zhu
Dr. Eng
FRANCISCO JAVIER Purvis
FRANCISCO JAVIER Purvis
FRANCISCO JAVIER Zarate
FRANCISCO JAVIER Purvis, FRANCISCO JAVIER Zhu
BASIM Dueñas
Christiano Florence MD
Dr. Bateman
Jaskaran MCINTYRE
Nick Purvis, PA

## 2018-09-13 NOTE — PROVIDER CONTACT NOTE (CRITICAL VALUE NOTIFICATION) - RECOMMENDATIONS
Continue to monitor the pt.
Notify Provider
no interventions at this time.
no interventions at this time
none at this time
Continue with plan of care, pt is in ARDS
none at this time team aware
treat electrolyte imbalance
Continue with plan of care
administer sodium bicarb

## 2018-09-13 NOTE — PROGRESS NOTE ADULT - ASSESSMENT
23 yo female admitted to NSCU with diffuse SAH. Now intubated and found to be tachycardia/hypotensive with bedside TTE revealing global LV systolic dysfunction.

## 2018-09-13 NOTE — PROVIDER CONTACT NOTE (CRITICAL VALUE NOTIFICATION) - SITUATION
pH 7.18 Lactate 4.4
pt admitted from outside hospital CT + high grade SAH
Pt's pH on VBG is 7.13
ph 7.16  pco2 75
Calcium 6.0
Pt's ABG results  pH 7.16  pCO2 72  K 6.2
pt admitted to NSCU 9/12 transferred from outside hospital. CT + high grade SAH
Phosphorus 0.9
venous pH 7.09. Arterial gas pH 7.14, pCO2 71
ABG pH 7.20, pCO2 75
pH 7.18, pCO2 75

## 2018-09-13 NOTE — PROVIDER CONTACT NOTE (CRITICAL VALUE NOTIFICATION) - PERSON GIVING RESULT:
Anitha Red
katrin nolen in lab
Anitha Red, Stat lab
Lisa Barrett
Anitha Red, Stat lab
Luba Cartwright
Lisa Barrett
LAB Radha Johns
Mansi Calderon, stat lab
Radha Johns - Chay
STAT lab Juan Ramon Willingham

## 2018-09-13 NOTE — PROVIDER CONTACT NOTE (CRITICAL VALUE NOTIFICATION) - BACKGROUND
SAH
Pt admitted for SAH
SAH
SAH
no pmhx noted
pt admitted from outside hospital CT + high grade SAH. No past medical history.
[facility] HH5 MF4
SAH Grade V
no significant past medical history noted
Pt admitted for SAH HH5
SAH

## 2018-09-13 NOTE — PROGRESS NOTE ADULT - SUBJECTIVE AND OBJECTIVE BOX
HPI:  34 yo F with no pmhx BIBEMS to [place] unconscious.  According to OSH and boyfriend, patient experienced WHOL while out to dinner and took Tylenol without relief.  Patient went to bed and boyfriend felt bed shaking to find patient unresponsive.  EMS called.  Patient Ambu bagged upon arrival to OSH with GCS 3t.  Patient transferred to [facility] for further management. CT head demonstrated diffuse MF4 SAH and CXR demonstrated ARDS. (12 Sep 2018 02:02)  Extensive discussion with the family - pt remains full code.    On admission, the patient was:  GCS: 3t.  Hunt-Chahal: 5  modified Epstein: 4    Daily ICU data reviewed.    EXAMINATION:  HEENT:  3 mm pupils fixed, no dolls  Neuro:  no brainstem reflexes, no overbreathing either, no motors.  Cards:  tachycardic  Respiratory:  intubated  Abdomen:  soft  Extremities:  no edema  Skin:  warm/dry

## 2018-09-13 NOTE — PROGRESS NOTE ADULT - SUBJECTIVE AND OBJECTIVE BOX
Visit Summary: Patient seen and evaluated at bedside.    Overnight Events:  none    Exam:  T(C): 37.4 (09-13-18 @ 06:00), Max: 38.4 (09-12-18 @ 15:00)  HR: 126 (09-13-18 @ 06:00) (122 - 152)  BP: 150/123 (09-12-18 @ 17:00) (86/55 - 150/123)  RR: 30 (09-13-18 @ 06:00) (0 - 30)  SpO2: 100% (09-13-18 @ 06:00) (76% - 100%)  Wt(kg): --    no brainstem reflexes  flaccid x4                        13.0   25.4  )-----------( 367      ( 12 Sep 2018 19:56 )             42.2     09-13    157<H>  |  122<H>  |  12  ----------------------------<  171<H>  4.6   |  26  |  0.86    Ca    8.0<L>      13 Sep 2018 03:54  Phos  4.4     09-12  Mg     1.8     09-12    TPro  5.1<L>  /  Alb  2.6<L>  /  TBili  0.1<L>  /  DBili  x   /  AST  66<H>  /  ALT  53<H>  /  AlkPhos  48  09-12  PT/INR - ( 12 Sep 2018 01:56 )   PT: 13.5 sec;   INR: 1.24 ratio         PTT - ( 12 Sep 2018 01:56 )  PTT:29.2 sec

## 2018-09-13 NOTE — PROVIDER CONTACT NOTE (CRITICAL VALUE NOTIFICATION) - ASSESSMENT
No change in neuro status
Pt intubated, ABG taken during code when pt went into Vtach. Pt now ST
no change in neuro exam
see assessment flowsheet for pt neuro exam. pt on multiple vasopressors at this time to maintain SBP. pt had episode of PEA during shift (compressions initiated, 1 epi given, followed by sodium bicarb).
no change in neuro status
Pt remains hemodynamically unstable on 2 pressors. Pt on full vent support. Pt neuro exam remains very poor.
no changes
See assessment and intervention flowsheet for neurostatus
no change from previous blood gas
see flowsheet for assessment. pt hemodynamically unstable. pt on two vasopressors at this time. O2 saturation <90% since admission. pt on full vent support FiO2 at 100%
Pt intubated on PEEP of 15, in flash pulmonary edema, desatting while ambu-bagging

## 2018-09-13 NOTE — PROGRESS NOTE ADULT - ASSESSMENT
ASSESSMENT/PLAN:  21 yo F with unremarkable PMH, presented with SAH HH5, mF4. Unknown source.  Cerebral edema, possible anoxic cerebral injury.  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  Neurogenic cardiomyopathy with depressed LV function 11%.  S/p cardiac arrest - PEA with ROSC 2 min.  Neurogenic pulmonary edema vs aspiration PNA. Severe ARDS.  Metabolic lactate acidosis    NEURO:  comachecks q1hr  off sedation, PRN Fentanyl  S/p Mannitol, would consider albumin + Lasix or inhalation of NO if desaturates  normalize parameters for formal brain death exam - ABG improving, switch to Plasmalyte - hypernatremic  Vent - ARDS protocol  On max Jaya, VAso 0.04 + Milrinone  Vanco + Unasyn for possible aspiration PNA  VTE prophylaxis: [x] SCDs [x] hold chemoprophylaxis due to SAH    CODE STATUS:  [x] Full Code [] DNR [] DNI [] Palliative/Comfort Care    DISPOSITION:  [x] ICU [] Stroke Unit [] Floor [] EMU [] RCU [] PCU    [x] Patient is at high risk of neurologic deterioration/death due to: cerebral herniation.      Time spent: 45 critical care minutes    Contact: [phone]

## 2018-09-14 DIAGNOSIS — J96.01 ACUTE RESPIRATORY FAILURE WITH HYPOXIA: ICD-10-CM

## 2018-09-14 DIAGNOSIS — I42.8 OTHER CARDIOMYOPATHIES: ICD-10-CM

## 2018-09-14 LAB
ANION GAP SERPL CALC-SCNC: 9 MMOL/L — SIGNIFICANT CHANGE UP (ref 5–17)
BASE EXCESS BLDA CALC-SCNC: -1 MMOL/L — SIGNIFICANT CHANGE UP (ref -2–2)
BASE EXCESS BLDA CALC-SCNC: 1.5 MMOL/L — SIGNIFICANT CHANGE UP (ref -2–2)
BASE EXCESS BLDA CALC-SCNC: 1.6 MMOL/L — SIGNIFICANT CHANGE UP (ref -2–2)
BASE EXCESS BLDA CALC-SCNC: 2.4 MMOL/L — HIGH (ref -2–2)
BUN SERPL-MCNC: 18 MG/DL — SIGNIFICANT CHANGE UP (ref 7–23)
CALCIUM SERPL-MCNC: 8.1 MG/DL — LOW (ref 8.4–10.5)
CHLORIDE SERPL-SCNC: 119 MMOL/L — HIGH (ref 96–108)
CO2 BLDA-SCNC: 25 MMOL/L — SIGNIFICANT CHANGE UP (ref 22–30)
CO2 BLDA-SCNC: 26 MMOL/L — SIGNIFICANT CHANGE UP (ref 22–30)
CO2 BLDA-SCNC: 26 MMOL/L — SIGNIFICANT CHANGE UP (ref 22–30)
CO2 BLDA-SCNC: 28 MMOL/L — SIGNIFICANT CHANGE UP (ref 22–30)
CO2 SERPL-SCNC: 23 MMOL/L — SIGNIFICANT CHANGE UP (ref 22–31)
CREAT SERPL-MCNC: 0.67 MG/DL — SIGNIFICANT CHANGE UP (ref 0.5–1.3)
GAS PNL BLDA: SIGNIFICANT CHANGE UP
GLUCOSE SERPL-MCNC: 127 MG/DL — HIGH (ref 70–99)
HCO3 BLDA-SCNC: 24 MMOL/L — SIGNIFICANT CHANGE UP (ref 21–29)
HCO3 BLDA-SCNC: 25 MMOL/L — SIGNIFICANT CHANGE UP (ref 21–29)
HCO3 BLDA-SCNC: 25 MMOL/L — SIGNIFICANT CHANGE UP (ref 21–29)
HCO3 BLDA-SCNC: 27 MMOL/L — SIGNIFICANT CHANGE UP (ref 21–29)
HCT VFR BLD CALC: 26.6 % — LOW (ref 34.5–45)
HGB BLD-MCNC: 9 G/DL — LOW (ref 11.5–15.5)
HOROWITZ INDEX BLDA+IHG-RTO: 60 — SIGNIFICANT CHANGE UP
MAGNESIUM SERPL-MCNC: 2 MG/DL — SIGNIFICANT CHANGE UP (ref 1.6–2.6)
MCHC RBC-ENTMCNC: 31.2 PG — SIGNIFICANT CHANGE UP (ref 27–34)
MCHC RBC-ENTMCNC: 33.7 GM/DL — SIGNIFICANT CHANGE UP (ref 32–36)
MCV RBC AUTO: 92.7 FL — SIGNIFICANT CHANGE UP (ref 80–100)
PCO2 BLDA: 37 MMHG — SIGNIFICANT CHANGE UP (ref 32–46)
PCO2 BLDA: 38 MMHG — SIGNIFICANT CHANGE UP (ref 32–46)
PCO2 BLDA: 41 MMHG — SIGNIFICANT CHANGE UP (ref 32–46)
PCO2 BLDA: 43 MMHG — SIGNIFICANT CHANGE UP (ref 32–46)
PH BLDA: 7.36 — SIGNIFICANT CHANGE UP (ref 7.35–7.45)
PH BLDA: 7.42 — SIGNIFICANT CHANGE UP (ref 7.35–7.45)
PH BLDA: 7.44 — SIGNIFICANT CHANGE UP (ref 7.35–7.45)
PH BLDA: 7.44 — SIGNIFICANT CHANGE UP (ref 7.35–7.45)
PHOSPHATE SERPL-MCNC: 1.9 MG/DL — LOW (ref 2.5–4.5)
PHOSPHATE SERPL-MCNC: 2.9 MG/DL — SIGNIFICANT CHANGE UP (ref 2.5–4.5)
PLATELET # BLD AUTO: 168 K/UL — SIGNIFICANT CHANGE UP (ref 150–400)
PO2 BLDA: 105 MMHG — SIGNIFICANT CHANGE UP (ref 74–108)
PO2 BLDA: 150 MMHG — HIGH (ref 74–108)
PO2 BLDA: 79 MMHG — SIGNIFICANT CHANGE UP (ref 74–108)
PO2 BLDA: 98 MMHG — SIGNIFICANT CHANGE UP (ref 74–108)
POTASSIUM SERPL-MCNC: 3.9 MMOL/L — SIGNIFICANT CHANGE UP (ref 3.5–5.3)
POTASSIUM SERPL-SCNC: 3.9 MMOL/L — SIGNIFICANT CHANGE UP (ref 3.5–5.3)
RBC # BLD: 2.87 M/UL — LOW (ref 3.8–5.2)
RBC # FLD: 12.8 % — SIGNIFICANT CHANGE UP (ref 10.3–14.5)
SAO2 % BLDA: 96 % — SIGNIFICANT CHANGE UP (ref 92–96)
SAO2 % BLDA: 98 % — HIGH (ref 92–96)
SAO2 % BLDA: 99 % — HIGH (ref 92–96)
SAO2 % BLDA: 99 % — HIGH (ref 92–96)
SODIUM SERPL-SCNC: 151 MMOL/L — HIGH (ref 135–145)
WBC # BLD: 17.9 K/UL — HIGH (ref 3.8–10.5)
WBC # FLD AUTO: 17.9 K/UL — HIGH (ref 3.8–10.5)

## 2018-09-14 PROCEDURE — 99291 CRITICAL CARE FIRST HOUR: CPT

## 2018-09-14 PROCEDURE — 93970 EXTREMITY STUDY: CPT | Mod: 26

## 2018-09-14 PROCEDURE — 93306 TTE W/DOPPLER COMPLETE: CPT | Mod: 26

## 2018-09-14 PROCEDURE — 71045 X-RAY EXAM CHEST 1 VIEW: CPT | Mod: 26

## 2018-09-14 PROCEDURE — 99233 SBSQ HOSP IP/OBS HIGH 50: CPT

## 2018-09-14 RX ORDER — SODIUM BICARBONATE 1 MEQ/ML
50 SYRINGE (ML) INTRAVENOUS ONCE
Refills: 0 | Status: COMPLETED | OUTPATIENT
Start: 2018-09-14 | End: 2018-09-14

## 2018-09-14 RX ORDER — VASOPRESSIN 20 [USP'U]/ML
0.03 INJECTION INTRAVENOUS
Qty: 100 | Refills: 0 | Status: DISCONTINUED | OUTPATIENT
Start: 2018-09-14 | End: 2018-09-16

## 2018-09-14 RX ADMIN — Medication 3.16 MICROGRAM(S)/KG/MIN: at 19:00

## 2018-09-14 RX ADMIN — CHLORHEXIDINE GLUCONATE 15 MILLILITER(S): 213 SOLUTION TOPICAL at 05:08

## 2018-09-14 RX ADMIN — AMPICILLIN SODIUM AND SULBACTAM SODIUM 200 GRAM(S): 250; 125 INJECTION, POWDER, FOR SUSPENSION INTRAMUSCULAR; INTRAVENOUS at 11:40

## 2018-09-14 RX ADMIN — CHLORHEXIDINE GLUCONATE 15 MILLILITER(S): 213 SOLUTION TOPICAL at 17:57

## 2018-09-14 RX ADMIN — AMPICILLIN SODIUM AND SULBACTAM SODIUM 200 GRAM(S): 250; 125 INJECTION, POWDER, FOR SUSPENSION INTRAMUSCULAR; INTRAVENOUS at 05:08

## 2018-09-14 RX ADMIN — Medication 1 DROP(S): at 05:08

## 2018-09-14 RX ADMIN — Medication 50 MILLIEQUIVALENT(S): at 02:22

## 2018-09-14 RX ADMIN — LEVETIRACETAM 1000 MILLIGRAM(S): 250 TABLET, FILM COATED ORAL at 05:08

## 2018-09-14 RX ADMIN — PANTOPRAZOLE SODIUM 40 MILLIGRAM(S): 20 TABLET, DELAYED RELEASE ORAL at 11:40

## 2018-09-14 RX ADMIN — Medication 1 DROP(S): at 21:35

## 2018-09-14 RX ADMIN — MILRINONE LACTATE 5.05 MICROGRAM(S)/KG/MIN: 1 INJECTION, SOLUTION INTRAVENOUS at 19:00

## 2018-09-14 RX ADMIN — VASOPRESSIN 1.8 UNIT(S)/MIN: 20 INJECTION INTRAVENOUS at 19:00

## 2018-09-14 RX ADMIN — Medication 1 DROP(S): at 13:56

## 2018-09-14 RX ADMIN — LEVETIRACETAM 1000 MILLIGRAM(S): 250 TABLET, FILM COATED ORAL at 17:57

## 2018-09-14 RX ADMIN — AMPICILLIN SODIUM AND SULBACTAM SODIUM 200 GRAM(S): 250; 125 INJECTION, POWDER, FOR SUSPENSION INTRAMUSCULAR; INTRAVENOUS at 17:57

## 2018-09-14 NOTE — PROGRESS NOTE ADULT - SUBJECTIVE AND OBJECTIVE BOX
Visit Summary: Patient seen and evaluated at bedside.    Overnight Events: none    Exam:  T(C): 36.8 (09-14-18 @ 04:00), Max: 37.4 (09-13-18 @ 05:00)  HR: 104 (09-14-18 @ 04:30) (100 - 133)  BP: --  RR: 26 (09-14-18 @ 04:30) (12 - 30)  SpO2: 98% (09-14-18 @ 04:30) (86% - 100%)  Wt(kg): --    no brainstem reflexes  flaccidx4                      10.5   20.3  )-----------( 213      ( 13 Sep 2018 22:18 )             32.9     09-13    153<H>  |  121<H>  |  15  ----------------------------<  147<H>  3.8   |  25  |  0.82    Ca    8.2<L>      13 Sep 2018 22:18  Phos  0.9     09-13  Mg     2.0     09-13    TPro  5.0<L>  /  Alb  2.6<L>  /  TBili  0.4  /  DBili  x   /  AST  86<H>  /  ALT  41  /  AlkPhos  47  09-13

## 2018-09-14 NOTE — PROGRESS NOTE ADULT - SUBJECTIVE AND OBJECTIVE BOX
SUBJECTIVE AND OBJECTIVE: Patient seen and examined at bedside. Remains intubated on pressor support    INTERVAL HPI/OVERNIGHT EVENTS: able to wean off one pressor, still remains on pressor support and with mechanical ventilation.  Met with patient and mother at bedside. Mother tearful, praying patient will get better.  She shared with me how John is a person who loves life, and is a beautiful person inside and out. Showed pictures of when she was well.   Expressed a need for her daughter to get better. The doctors told her today that she was able to come off one of the blood pressure medications, "three more to go". Mother says that any "negative discussions" need to happen outside the room and only "positive vibes" allowed in the room. She is praying for a miracle.     DNR on chart:   Allergies    No Known Allergies    Intolerances    MEDICATIONS  (STANDING):  ampicillin/sulbactam  IVPB      ampicillin/sulbactam  IVPB 3 Gram(s) IV Intermittent every 6 hours  artificial tears (preservative free) Ophthalmic Solution 1 Drop(s) Both EYES every 8 hours  chlorhexidine 0.12% Liquid 15 milliLiter(s) Swish and Spit two times a day  influenza   Vaccine 0.5 milliLiter(s) IntraMuscular once  levETIRAcetam 1000 milliGRAM(s) Oral two times a day  milrinone Infusion 0.25 MICROgram(s)/kG/Min (5.055 mL/Hr) IV Continuous <Continuous>  norepinephrine Infusion 0.05 MICROgram(s)/kG/Min (3.159 mL/Hr) IV Continuous <Continuous>  pantoprazole  Injectable 40 milliGRAM(s) IV Push daily  vasopressin Infusion 0.03 Unit(s)/Min (1.8 mL/Hr) IV Continuous <Continuous>    MEDICATIONS  (PRN):  acetaminophen    Suspension .. 650 milliGRAM(s) Oral every 6 hours PRN Temp greater or equal to 38C (100.4F), Mild Pain (1 - 3)      ITEMS UNCHECKED ARE NOT PRESENT    PRESENT SYMPTOMS: [ x]Unable to obtain due to poor mentation   Source if other than patient:  [ ]Family   [ ]Team     Pain (Impact on QOL):    Location:  Minimal acceptable level (0-10 scale):                   Aggrevating factors:  Quality:  Radiation:  Severity (0-10 scale):    Timing:    Dyspnea:                           [ ]Mild [ ]Moderate [ ]Severe  Anxiety:                             [ ]Mild [ ]Moderate [ ]Severe  Fatigue:                             [ ]Mild [ ]Moderate [ ]Severe  Nausea:                             [ ]Mild [ ]Moderate [ ]Severe  Loss of appetite:              [ ]Mild [ ]Moderate [ ]Severe  Constipation:                    [ ]Mild [ ]Moderate [ ]Severe    PAIN AD Score:	  [email or web address] (Ctrl + left click to view)    Other Symptoms:  [ ]All other review of systems negative     Karnofsky Performance Score/Palliative Performance Status Version 2:        10 %  PHYSICAL EXAM:  Vital Signs Last 24 Hrs  T(C): 36.4 (14 Sep 2018 17:00), Max: 37.2 (14 Sep 2018 09:00)  T(F): 97.5 (14 Sep 2018 17:00), Max: 99 (14 Sep 2018 09:00)  HR: 97 (14 Sep 2018 17:15) (96 - 133)  BP: --  BP(mean): --  RR: 26 (14 Sep 2018 17:15) (15 - 28)  SpO2: 100% (14 Sep 2018 17:15) (86% - 100%) I&O's Summary    13 Sep 2018 07:01  -  14 Sep 2018 07:00  --------------------------------------------------------  IN: 1657.3 mL / OUT: 1220 mL / NET: 437.3 mL    14 Sep 2018 07:01  -  14 Sep 2018 17:21  --------------------------------------------------------  IN: 848.4 mL / OUT: 430 mL / NET: 418.4 mL     GENERAL:  [ ]Alert  [ ]Oriented x   [ ]Lethargic  [ ]Cachexia  [ x]Unarousable  [ ]Verbal  [ ]Non-Verbal  Behavioral:   [ ] Anxiety  [ ] Delirium [ ] Agitation [ ] Other  HEENT:  [ ]Normal   [ ]Dry mouth   [ x]ET Tube/Trach  [ ]Oral lesions  PULMONARY:   [x ]Clear [ ]Tachypnea  [ ]Audible excessive secretions   [ ]Rhonchi        [ ]Right [ ]Left [ ]Bilateral  [ ]Crackles        [ ]Right [ ]Left [ ]Bilateral  [ ]Wheezing     [ ]Right [ ]Left [ ]Bilateral  CARDIOVASCULAR:    [x ]Regular [ ]Irregular [ ]Tachy  [ ]Kory [ ]Murmur [ ]Other  GASTROINTESTINAL:  [ x]Soft  [ ]Distended   [ x]+BS  [ x]Non tender [ ]Tender  [ ]PEG [ ]OGT/ NGT   Last BM:    GENITOURINARY:  [ ]Normal [ ] Incontinent   [ ]Oliguria/Anuria   [x ]Cole  MUSCULOSKELETAL:   [ ]Normal   [ ]Weakness  [ x]Bed/Wheelchair bound [ ]Edema  NEUROLOGIC:   [ ]No focal deficits  [ ] Cognitive impairment  [ ] Dysphagia [ ]Dysarthria [ ] Paresis [x ]Other- unresponsive; fixed pupils; no brain stem reflexes on examination.  SKIN:   [x ]Normal   [ ]Pressure ulcer(s)  [ ]Rash    CRITICAL CARE:  [x ] Shock Present  [ ]Septic [x ]Cardiogenic [ ]Neurologic [ ]Hypovolemic  [ ]  Vasopressors [ ]  Inotropes   [ ] Respiratory failure present  [ ] Acute  [ ] Chronic [ ] Hypoxic  [ ] Hypercarbic [ ] Other  [ ] Other organ failure     LABS:                        10.5   20.3  )-----------( 213      ( 13 Sep 2018 22:18 )             32.9   09-13    153<H>  |  121<H>  |  15  ----------------------------<  147<H>  3.8   |  25  |  0.82    Ca    8.2<L>      13 Sep 2018 22:18  Phos  2.9     09-14  Mg     2.0     09-13    TPro  5.0<L>  /  Alb  2.6<L>  /  TBili  0.4  /  DBili  x   /  AST  86<H>  /  ALT  41  /  AlkPhos  47  09-13    RADIOLOGY & ADDITIONAL STUDIES:  < from: TTE with Doppler (w/Cont) (09.14.18 @ 10:06) >  Patient name: JOHN HERMAN  YOB: 1995   Age: 22 (F)   MR#: [id number]  Study Date: 9/14/2018  Location: NSCU-RNSCUSonographer: Pari Pérez CY  Study quality: Technically difficult  Referring Physician: Augustus Jara MD  Blood Pressure: 109/69 mmHg  Height: 160 cm  Weight: 67 kg  BSA: 1.7 m2  ------------------------------------------------------------------------  PROCEDURE: Transthoracic echocardiogram with 2-D, M-Mode  and complete spectral and color flow Doppler. Verbal  consent was obtained for injection of  Ultrasonic Enhancing  Agent following a discussion of risks and benefits.  Following intravenous injection of Ultrasonic Enhancing  Agent , harmonic imaging was performed.  INDICATION: Cardiomyopathy, unspecified (I42.9)  ------------------------------------------------------------------------  Dimensions:    Normal Values:  LA:     2.9    2.0 - 4.0 cm  Ao:     3.1    2.0 - 3.8 cm  SEPTUM: 0.7    0.6 - 1.2 cm  PWT:    0.7    0.6 - 1.1 cm  LVIDd:  5.9    3.0 - 5.6 cm  LVIDs:         1.8 - 4.0 cm  Derived variables:  LVMI: 90 g/m2  RWT: 0.23  EF (Visual Estimate): 10-15 %  Doppler Peak Velocity (m/sec): AoV=0.7  ------------------------------------------------------------------------  Observations:  Mitral Valve: Tethered mitral valve leaflets with normal  opening. Moderate-severe mitral regurgitation.  Aortic Valve/Aorta: Aortic valve not well visualized. Peak  transaortic valve gradient equals 2 mm Hg. No aortic valve  regurgitation seen. Peak left ventricular outflow tract  gradient equals 1 mm Hg.  Aortic Root: 3.1 cm.  Left Atrium: Normal left atrium.  LA volume index = 23  cc/m2.  Left Ventricle: Endocardial visualization enhanced with  intravenous injection of Ultrasonic Enhancing Agent  (Definity). Severe global left ventricular systolic  dysfunction. Eccentric left ventricular hypertrophy  (dilated left ventricle with normal relative wall  thickness). Mild left ventricular enlargement. Mild  diastolic dysfunction (Stage I).  Right Heart: Normal right atrium. The right ventricle is  not well visualized; from limited views, grossly normal  right ventricular size and systolic function. Tricuspid  valve not well visualized, probably normal. Minimal  tricuspid regurgitation. Normal pulmonic valve.  Pericardium/Pleura: Normal pericardium with no pericardial  effusion.  Hemodynamic: Inadequate tricuspid regurgitation Doppler  envelope precludes estimation of RVSP.  ------------------------------------------------------------------------  Conclusions:  1. Tethered mitral valve leaflets with normal opening.  Moderate-severe mitral regurgitation.  2. Endocardial visualization enhanced with intravenous  injection of Ultrasonic Enhancing Agent (Definity). Severe  global left ventricular systolic dysfunction.  3. The right ventricle is not well visualized; from limited  views, grossly normal right ventricular size and systolic  function.  *** Compared with echocardiogram of 9/12/2018, results are  similar on today's study.  ------------------------------------------------------------------------  Confirmed on  9/14/2018 - 15:43:25 by Darrel Marshall M.D.  ------------------------------------------------------------------------    < end of copied text >      Protein Calorie Malnutrition:  [x ] PPSV2 < or = 30%  [ ] significant weight loss [ ] poor nutritional intake [ ] anasarca [ ] catabolic state Albumin, Serum: 2.6 g/dL (09-13-18 @ 22:18)  Artificial Nutrition [x ]     REFERRALS:   [x ]Chaplaincy  [ ] Hospice  [x ]Child Life  [x ]Social Work  [ ]Case management [ ]Holistic Therapy   Goals of Care Document: none

## 2018-09-14 NOTE — PROGRESS NOTE ADULT - ASSESSMENT
ASSESSMENT/PLAN:  21 yo F with unremarkable PMH, presented with SAH HH5, mF4. Unknown source.  Cerebral edema, possible anoxic cerebral injury.  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  Neurogenic cardiomyopathy with depressed LV function 11%.  S/p cardiac arrest - PEA with ROSC 2 min.  Neurogenic pulmonary edema vs aspiration PNA. Severe ARDS.  Metabolic lactate acidosis    NEURO:  comachecks q1hr  off sedation, PRN Fentanyl  S/p Mannitol, would consider albumin + Lasix or inhalation of NO if desaturates  normalize parameters for formal brain death exam - ABG improving, switch to Plasmalyte - hypernatremic  Vent - ARDS protocol  On max Jaya, VAso 0.04 + Milrinone  Vanco + Unasyn for possible aspiration PNA  VTE prophylaxis: [x] SCDs [x] hold chemoprophylaxis due to SAH    CODE STATUS:  [x] Full Code [] DNR [] DNI [] Palliative/Comfort Care    DISPOSITION:  [x] ICU [] Stroke Unit [] Floor [] EMU [] RCU [] PCU    [x] Patient is at high risk of neurologic deterioration/death due to: cerebral herniation.      Time spent: 45 critical care minutes    Contact: [phone] ASSESSMENT/PLAN:  21 yo F with unremarkable PMH, presented with SAH HH5, mF4. Unknown source.  Cerebral edema, possible anoxic cerebral injury.  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  Neurogenic cardiomyopathy with depressed LV function 11%.  S/p cardiac arrest - PEA with ROSC 2 min.  Neurogenic pulmonary edema vs aspiration PNA. Severe ARDS.  Metabolic lactate acidosis    NEURO:  comachecks q1hr  off sedation, PRN Fentanyl  S/p Mannitol, would consider albumin + Lasix or inhalation of NO if desaturates  normalize parameters for formal brain death exam - ABG improving, switch to Plasmalyte - hypernatremic  Vent - ARDS protocol  On max Jaya, VAso 0.04 + Milrinone  Vanco + Unasyn for possible aspiration PNA  VTE prophylaxis: [x] SCDs [x] hold chemoprophylaxis due to SAH    CODE STATUS:  [x] Full Code [] DNR [] DNI [] Palliative/Comfort Care    DISPOSITION:  [x] ICU [] Stroke Unit [] Floor [] EMU [] RCU [] PCU    [x] Patient is at high risk of neurologic deterioration/death due to: cerebral herniation. SAH      Time spent: 45 critical care minutes    Contact: [phone]

## 2018-09-14 NOTE — PROGRESS NOTE ADULT - PROBLEM SELECTOR PLAN 4
emotional support provided to mother at bedside. chaplaincy and child life involved. discussed with NSCU SW; will continue to be available pending clinical trajectory and any future family meetings. please call with questions

## 2018-09-14 NOTE — DIETITIAN INITIAL EVALUATION ADULT. - ENERGY NEEDS
Ht 63 inches Wt 148.5 pounds BMI 26.3 Kg/m^2   pounds +/- 10%; 129% IBW  Edema: 1+ bilateral wrist/hand Skin: no pressure injuries  Other pertinent information: 21 yo female with no pertinent PMH presented with worst headache of life. Found with HH5 MF4 diffuse SAH with cerebral edema with concern for possible anoxic cerebral brain injury and S/P cardiac arrest. Ongoing GOC discussions with family

## 2018-09-14 NOTE — PROGRESS NOTE ADULT - SUBJECTIVE AND OBJECTIVE BOX
Subjective: Patient seen and examined. No new events except as noted.   remains in NSCU intubated   Hypoxic episode last night requiring increase in FIO2     REVIEW OF SYSTEMS:    Unable to obtain     MEDICATIONS:  MEDICATIONS  (STANDING):  ampicillin/sulbactam  IVPB      ampicillin/sulbactam  IVPB 3 Gram(s) IV Intermittent every 6 hours  artificial tears (preservative free) Ophthalmic Solution 1 Drop(s) Both EYES every 8 hours  chlorhexidine 0.12% Liquid 15 milliLiter(s) Swish and Spit two times a day  influenza   Vaccine 0.5 milliLiter(s) IntraMuscular once  levETIRAcetam 1000 milliGRAM(s) Oral two times a day  milrinone Infusion 0.25 MICROgram(s)/kG/Min (5.055 mL/Hr) IV Continuous <Continuous>  norepinephrine Infusion 0.05 MICROgram(s)/kG/Min (3.159 mL/Hr) IV Continuous <Continuous>  pantoprazole  Injectable 40 milliGRAM(s) IV Push daily  vasopressin Infusion 0.06 Unit(s)/Min (3.6 mL/Hr) IV Continuous <Continuous>      PHYSICAL EXAM:  T(C): 37.1 (09-14-18 @ 13:00), Max: 37.2 (09-14-18 @ 09:00)  HR: 100 (09-14-18 @ 13:45) (100 - 133)  BP: --  RR: 26 (09-14-18 @ 13:45) (15 - 28)  SpO2: 100% (09-14-18 @ 13:45) (86% - 100%)  Wt(kg): --  I&O's Summary    13 Sep 2018 07:01  -  14 Sep 2018 07:00  --------------------------------------------------------  IN: 1657.3 mL / OUT: 1220 mL / NET: 437.3 mL    14 Sep 2018 07:01  -  14 Sep 2018 14:19  --------------------------------------------------------  IN: 675 mL / OUT: 275 mL / NET: 400 mL          Appearance: Intubated	  HEENT:   dry  oral mucosa  Lymphatic: No lymphadenopathy  Cardiovascular: tachycardia  S1 S2, No JVD, No murmurs, No edema  Respiratory: ventilated 	  Psychiatry: A & O x 0, Mood & affect appropriate  Gastrointestinal:  Soft, Non-tender, + BS	  Skin: No rashes, No ecchymoses, No cyanosis	  Neurologic: unresponsive   Extremities: decreased  range of motion, No clubbing, cyanosis or edema  Vascular: Peripheral pulses palpable 2+ bilaterally      LABS:    CARDIAC MARKERS:  CARDIAC MARKERS ( 11 Sep 2018 21:46 )  .100 ng/mL / x     / 117 U/L / x     / 1.4 ng/mL                                10.5   20.3  )-----------( 213      ( 13 Sep 2018 22:18 )             32.9     09-13    153<H>  |  121<H>  |  15  ----------------------------<  147<H>  3.8   |  25  |  0.82    Ca    8.2<L>      13 Sep 2018 22:18  Phos  2.9     09-14  Mg     2.0     09-13    TPro  5.0<L>  /  Alb  2.6<L>  /  TBili  0.4  /  DBili  x   /  AST  86<H>  /  ALT  41  /  AlkPhos  47  09-13    proBNP:   Lipid Profile:   HgA1c:   TSH:     2  Negative          TELEMETRY: 	SR    ECG:  	  RADIOLOGY:   DIAGNOSTIC TESTING:  [ ] Echocardiogram:  [ ]  Catheterization:  [ ] Stress Test:    OTHER:

## 2018-09-14 NOTE — DIETITIAN INITIAL EVALUATION ADULT. - NS AS NUTRI INTERV ENTERAL NUTRITION
If patient should require alternate means of nutrition support and lies within [facility], recommend initiate trickle feeds of Vital AF 1.2 while on high dose/multiple pressors. Advance as tolerated to goal of Vital AF 1.2 @ 60 cc/hr x 24 hrs to provide 1440 cc fluid, 1728 nesas/d (26 nessa/Kg), and 108 gm prot/d (1.6 gm prot/Kg) based upon dosing weight 67.4 Kg.

## 2018-09-14 NOTE — PROGRESS NOTE ADULT - SUBJECTIVE AND OBJECTIVE BOX
HPI:  32 yo F with no pmhx BIBEMS to [place] unconscious.  According to OSH and boyfriend, patient experienced WHOL while out to dinner and took Tylenol without relief.  Patient went to bed and boyfriend felt bed shaking to find patient unresponsive.  EMS called.  Patient Ambu bagged upon arrival to OSH with GCS 3t.  Patient transferred to [facility] for further management. CT head demonstrated diffuse MF4 SAH and CXR demonstrated ARDS. (12 Sep 2018 02:02)  Extensive discussion with the family - pt remains full code.    On admission, the patient was:  GCS: 3t.  Hunt-Chahal: 5  modified Epstein: 4    Daily ICU data reviewed.    EXAMINATION:  HEENT:  3 mm pupils fixed, no dolls  Neuro:  no brainstem reflexes, no overbreathing either, no motors.  Cards:  tachycardic  Respiratory:  intubated  Abdomen:  soft  Extremities:  no edema  Skin:  warm/dry

## 2018-09-14 NOTE — PROGRESS NOTE ADULT - ASSESSMENT
32 yo patient with no PMHx p/w SAH HH5 MF4.     - Supportive care per ICU  - GOC discussion with family

## 2018-09-14 NOTE — DIETITIAN INITIAL EVALUATION ADULT. - OTHER INFO
Patient seen for length of stay on NSCU. Noted admit weight 148.5 pounds, unable to assess weight history PTA at this time. Per chart, NKFA or micronutrient supplementation PTA noted. Unable to assess for history of chewing/swallowing difficulties PTA at this time. No GI distress of note currently, currently on multiple pressors.

## 2018-09-14 NOTE — PROGRESS NOTE ADULT - ASSESSMENT
ASSESSMENT/PLAN:  23 yo F with unremarkable PMH, presented with SAH HH5, mF4. Unknown source.  Cerebral edema, possible anoxic cerebral injury.  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  Neurogenic cardiomyopathy with depressed LV function 11%.  S/p cardiac arrest - PEA with ROSC 2 min.  Neurogenic pulmonary edema vs aspiration PNA. Severe ARDS.  Metabolic lactate acidosis    NEURO:  comachecks q1hr  off sedation, PRN Fentanyl  Continue weaning off high PEEP/high FiO2, keep low TV  Na improving  Continue Ajya, VAso + Milrinone  Vanco + Unasyn for possible aspiration PNA  VTE prophylaxis: [x] SCDs [x] hold chemoprophylaxis due to unsecured SAH    CODE STATUS:  [x] Full Code [] DNR [] DNI [] Palliative/Comfort Care    DISPOSITION:  [x] ICU [] Stroke Unit [] Floor [] EMU [] RCU [] PCU    [x] Patient is at high risk of neurologic deterioration/death due to: cerebral herniation.      Time spent: 45 critical care minutes    Contact: [phone]

## 2018-09-14 NOTE — PROGRESS NOTE ADULT - SUBJECTIVE AND OBJECTIVE BOX
HPI:  32 yo F with no pmhx BIBEMS to [place] unconscious.  According to OSH and boyfriend, patient experienced WHOL while out to dinner and took Tylenol without relief.  Patient went to bed and boyfriend felt bed shaking to find patient unresponsive.  EMS called.  Patient Ambu bagged upon arrival to OSH with GCS 3t.  Patient transferred to [facility] for further management. CT head demonstrated diffuse MF4 SAH and CXR demonstrated ARDS. (12 Sep 2018 02:02)  Extensive discussion with the family - pt remains full code.    On admission, the patient was:  GCS: 3t.  Hunt-Chahal: 5  modified Epstein: 4    Daily ICU data reviewed.    EXAMINATION:  HEENT:  3 mm pupils fixed, no dolls  Neuro:  no brainstem reflexes, no overbreathing either, no motors.  Cards:  tachycardic  Respiratory:  intubated  Abdomen:  soft  Extremities:  no edema  Skin:  warm/dry

## 2018-09-15 LAB
ANION GAP SERPL CALC-SCNC: 10 MMOL/L — SIGNIFICANT CHANGE UP (ref 5–17)
BUN SERPL-MCNC: 20 MG/DL — SIGNIFICANT CHANGE UP (ref 7–23)
CALCIUM SERPL-MCNC: 8.4 MG/DL — SIGNIFICANT CHANGE UP (ref 8.4–10.5)
CHLORIDE SERPL-SCNC: 112 MMOL/L — HIGH (ref 96–108)
CO2 SERPL-SCNC: 24 MMOL/L — SIGNIFICANT CHANGE UP (ref 22–31)
CREAT SERPL-MCNC: 0.65 MG/DL — SIGNIFICANT CHANGE UP (ref 0.5–1.3)
GAS PNL BLDA: SIGNIFICANT CHANGE UP
GAS PNL BLDA: SIGNIFICANT CHANGE UP
GLUCOSE SERPL-MCNC: 114 MG/DL — HIGH (ref 70–99)
HCT VFR BLD CALC: 25.2 % — LOW (ref 34.5–45)
HGB BLD-MCNC: 8.2 G/DL — LOW (ref 11.5–15.5)
MAGNESIUM SERPL-MCNC: 2 MG/DL — SIGNIFICANT CHANGE UP (ref 1.6–2.6)
MCHC RBC-ENTMCNC: 30.4 PG — SIGNIFICANT CHANGE UP (ref 27–34)
MCHC RBC-ENTMCNC: 32.6 GM/DL — SIGNIFICANT CHANGE UP (ref 32–36)
MCV RBC AUTO: 93.3 FL — SIGNIFICANT CHANGE UP (ref 80–100)
PHOSPHATE SERPL-MCNC: 2.9 MG/DL — SIGNIFICANT CHANGE UP (ref 2.5–4.5)
PLATELET # BLD AUTO: 168 K/UL — SIGNIFICANT CHANGE UP (ref 150–400)
POTASSIUM SERPL-MCNC: 4 MMOL/L — SIGNIFICANT CHANGE UP (ref 3.5–5.3)
POTASSIUM SERPL-SCNC: 4 MMOL/L — SIGNIFICANT CHANGE UP (ref 3.5–5.3)
RBC # BLD: 2.7 M/UL — LOW (ref 3.8–5.2)
RBC # FLD: 12.7 % — SIGNIFICANT CHANGE UP (ref 10.3–14.5)
SODIUM SERPL-SCNC: 146 MMOL/L — HIGH (ref 135–145)
WBC # BLD: 15.2 K/UL — HIGH (ref 3.8–10.5)
WBC # FLD AUTO: 15.2 K/UL — HIGH (ref 3.8–10.5)

## 2018-09-15 PROCEDURE — 99291 CRITICAL CARE FIRST HOUR: CPT

## 2018-09-15 PROCEDURE — 71045 X-RAY EXAM CHEST 1 VIEW: CPT | Mod: 26

## 2018-09-15 RX ORDER — POTASSIUM PHOSPHATE, MONOBASIC POTASSIUM PHOSPHATE, DIBASIC 236; 224 MG/ML; MG/ML
30 INJECTION, SOLUTION INTRAVENOUS ONCE
Refills: 0 | Status: COMPLETED | OUTPATIENT
Start: 2018-09-15 | End: 2018-09-15

## 2018-09-15 RX ORDER — HYDROCORTISONE 20 MG
50 TABLET ORAL EVERY 8 HOURS
Refills: 0 | Status: DISCONTINUED | OUTPATIENT
Start: 2018-09-15 | End: 2018-09-17

## 2018-09-15 RX ADMIN — Medication 1 DROP(S): at 17:36

## 2018-09-15 RX ADMIN — AMPICILLIN SODIUM AND SULBACTAM SODIUM 200 GRAM(S): 250; 125 INJECTION, POWDER, FOR SUSPENSION INTRAMUSCULAR; INTRAVENOUS at 11:00

## 2018-09-15 RX ADMIN — CHLORHEXIDINE GLUCONATE 15 MILLILITER(S): 213 SOLUTION TOPICAL at 05:07

## 2018-09-15 RX ADMIN — AMPICILLIN SODIUM AND SULBACTAM SODIUM 200 GRAM(S): 250; 125 INJECTION, POWDER, FOR SUSPENSION INTRAMUSCULAR; INTRAVENOUS at 00:25

## 2018-09-15 RX ADMIN — Medication 3.16 MICROGRAM(S)/KG/MIN: at 05:55

## 2018-09-15 RX ADMIN — LEVETIRACETAM 1000 MILLIGRAM(S): 250 TABLET, FILM COATED ORAL at 17:36

## 2018-09-15 RX ADMIN — Medication 1 DROP(S): at 05:08

## 2018-09-15 RX ADMIN — Medication 50 MILLIGRAM(S): at 21:34

## 2018-09-15 RX ADMIN — Medication 1 DROP(S): at 21:34

## 2018-09-15 RX ADMIN — MILRINONE LACTATE 5.05 MICROGRAM(S)/KG/MIN: 1 INJECTION, SOLUTION INTRAVENOUS at 05:55

## 2018-09-15 RX ADMIN — POTASSIUM PHOSPHATE, MONOBASIC POTASSIUM PHOSPHATE, DIBASIC 83.33 MILLIMOLE(S): 236; 224 INJECTION, SOLUTION INTRAVENOUS at 05:07

## 2018-09-15 RX ADMIN — PANTOPRAZOLE SODIUM 40 MILLIGRAM(S): 20 TABLET, DELAYED RELEASE ORAL at 11:00

## 2018-09-15 RX ADMIN — VASOPRESSIN 1.8 UNIT(S)/MIN: 20 INJECTION INTRAVENOUS at 05:55

## 2018-09-15 RX ADMIN — Medication 50 MILLIGRAM(S): at 13:00

## 2018-09-15 RX ADMIN — LEVETIRACETAM 1000 MILLIGRAM(S): 250 TABLET, FILM COATED ORAL at 05:08

## 2018-09-15 RX ADMIN — AMPICILLIN SODIUM AND SULBACTAM SODIUM 200 GRAM(S): 250; 125 INJECTION, POWDER, FOR SUSPENSION INTRAMUSCULAR; INTRAVENOUS at 05:07

## 2018-09-15 RX ADMIN — AMPICILLIN SODIUM AND SULBACTAM SODIUM 200 GRAM(S): 250; 125 INJECTION, POWDER, FOR SUSPENSION INTRAMUSCULAR; INTRAVENOUS at 17:36

## 2018-09-15 RX ADMIN — CHLORHEXIDINE GLUCONATE 15 MILLILITER(S): 213 SOLUTION TOPICAL at 17:36

## 2018-09-15 NOTE — PROGRESS NOTE ADULT - SUBJECTIVE AND OBJECTIVE BOX
Subjective: Patient seen and examined. No new events except as noted.   remains intubated in NSCU. Neosynephrine dced   father at bedside     REVIEW OF SYSTEMS:    Unable to obtain     MEDICATIONS:  MEDICATIONS  (STANDING):  ampicillin/sulbactam  IVPB      ampicillin/sulbactam  IVPB 3 Gram(s) IV Intermittent every 6 hours  artificial tears (preservative free) Ophthalmic Solution 1 Drop(s) Both EYES every 8 hours  chlorhexidine 0.12% Liquid 15 milliLiter(s) Swish and Spit two times a day  hydrocortisone sodium succinate Injectable 50 milliGRAM(s) IV Push every 8 hours  influenza   Vaccine 0.5 milliLiter(s) IntraMuscular once  levETIRAcetam 1000 milliGRAM(s) Oral two times a day  milrinone Infusion 0.25 MICROgram(s)/kG/Min (5.055 mL/Hr) IV Continuous <Continuous>  norepinephrine Infusion 0.05 MICROgram(s)/kG/Min (3.159 mL/Hr) IV Continuous <Continuous>  pantoprazole  Injectable 40 milliGRAM(s) IV Push daily  vasopressin Infusion 0.03 Unit(s)/Min (1.8 mL/Hr) IV Continuous <Continuous>      PHYSICAL EXAM:  T(C): 36.9 (09-15-18 @ 19:00), Max: 36.9 (09-15-18 @ 19:00)  HR: 106 (09-15-18 @ 20:49) (93 - 111)  BP: 119/65 (09-15-18 @ 19:00) (119/65 - 119/65)  RR: 26 (09-15-18 @ 20:15) (23 - 26)  SpO2: 100% (09-15-18 @ 20:49) (99% - 100%)  Wt(kg): --  I&O's Summary    14 Sep 2018 07:01  -  15 Sep 2018 07:00  --------------------------------------------------------  IN: 2926.3 mL / OUT: 925 mL / NET: 2001.3 mL    15 Sep 2018 07:01  -  15 Sep 2018 21:09  --------------------------------------------------------  IN: 1684.6 mL / OUT: 735 mL / NET: 949.6 mL          Appearance: Intubated	  HEENT:   dry  oral mucosa  Lymphatic: No lymphadenopathy  Cardiovascular: tachycardia  S1 S2, No JVD, No murmurs, No edema  Respiratory: ventilated 	  Psychiatry: A & O x 0  Gastrointestinal:  Soft, Non-tender, + BS	  Skin: No rashes, No ecchymoses, No cyanosis	  Neurologic: unresponsive   Extremities: decreased  range of motion, No clubbing, cyanosis or edema  Vascular: Peripheral pulses palpable 2+ bilaterally        LABS:    CARDIAC MARKERS:                                9.0    17.9  )-----------( 168      ( 14 Sep 2018 23:12 )             26.6     09-14    151<H>  |  119<H>  |  18  ----------------------------<  127<H>  3.9   |  23  |  0.67    Ca    8.1<L>      14 Sep 2018 23:12  Phos  1.9     09-14  Mg     2.0     09-14    TPro  5.0<L>  /  Alb  2.6<L>  /  TBili  0.4  /  DBili  x   /  AST  86<H>  /  ALT  41  /  AlkPhos  47  09-13    proBNP:   Lipid Profile:   HgA1c:   TSH:             TELEMETRY: 	  Sinus tach   ECG:  	  RADIOLOGY:  < from: [facility] Duplex Lower Ext Vein Scan, Travis (09.14.18 @ 17:34) >    EXAM:  DUPLEX SCAN EXT VEINS LOWER BI                            PROCEDURE DATE:  09/14/2018            INTERPRETATION:  CLINICAL INFORMATION: Shortness of breath and hypoxia.    COMPARISON: None available.    TECHNIQUE: Duplex sonography of the BILATERAL LOWER extremities with   color and spectral Doppler, with and without compression.      FINDINGS:    There is normal compressibility of the bilateral common femoral, femoral   and popliteal veins. No calf vein thrombosis is detected.    Doppler examination shows normal spontaneous and phasic flow.    IMPRESSION:     No evidence of bilateral lower extremity deep venous thrombosis.            KINGA GURROLA M.D. ATTENDING RADIOLOGIST  This document has been electronically signed. Sep 15 2018  8:08AM                  DIAGNOSTIC TESTING:  [ ] Echocardiogram:  < from: TTE with Doppler (w/Cont) (09.14.18 @ 10:06) >    Patient name: KEYSHA HERMAN  YOB: 1995   Age: 22 (F)   MR#: [id number]  Study Date: 9/14/2018  [facility] Pari P[facility]rez RDCS  Study quality: Technically difficult  Referring Physician: Augustus Jara MD  Blood Pressure: 109/69 mmHg  Height: 160 cm  Weight: 67 kg  BSA: 1.7 m2  ------------------------------------------------------------------------  PROCEDURE: Transthoracic echocardiogram with 2-D, M-Mode  and complete spectral and color flow Doppler. Verbal  consent was obtained for injection of  Ultrasonic Enhancing  Agent following a discussion of risks and benefits.  Following intravenous injection of Ultrasonic Enhancing  Agent , harmonic imaging was performed.  INDICATION: Cardiomyopathy, unspecified (I42.9)  ------------------------------------------------------------------------  Dimensions:    Normal Values:  LA:     2.9    2.0 - 4.0 cm  Ao:     3.1    2.0 - 3.8 cm  SEPTUM: 0.7    0.6 - 1.2 cm  PWT:    0.7    0.6 - 1.1 cm  LVIDd:  5.9    3.0 - 5.6 cm  LVIDs:         1.8 - 4.0 cm  Derived variables:  LVMI: 90 g/m2  RWT: 0.23  EF (Visual Estimate): 10-15 %  Doppler Peak Velocity (m/sec): AoV=0.7  ------------------------------------------------------------------------  Observations:  Mitral Valve: Tethered mitral valve leaflets with normal  opening. Moderate-severe mitral regurgitation.  Aortic Valve/Aorta: Aortic valve not well visualized. Peak  transaortic valve gradient equals 2 mm Hg. No aortic valve  regurgitation seen. Peak left ventricular outflow tract  gradient equals 1 mm Hg.  Aortic Root: 3.1 cm.  Left Atrium: Normal left atrium.  LA volume index = 23  cc/m2.  Left Ventricle: Endocardial visualization enhanced with  intravenous injection of Ultrasonic Enhancing Agent  (Definity). Severe global left ventricular systolic  dysfunction. Eccentric left ventricular hypertrophy  (dilated left ventricle with normal relative wall  thickness). Mild left ventricular enlargement. Mild  diastolic dysfunction (Stage I).  Right Heart: Normal right atrium. The right ventricle is  not well visualized; from limited views, grossly normal  right ventricular size and systolic function. Tricuspid  valve not well visualized, probably normal. Minimal  tricuspid regurgitation. Normal pulmonic valve.  Pericardium/Pleura: Normal pericardium with no pericardial  effusion.  Hemodynamic: Inadequate tricuspid regurgitation Doppler  envelope precludes estimation of RVSP.  ------------------------------------------------------------------------  Conclusions:  1. Tethered mitral valve leaflets with normal opening.  Moderate-severe mitral regurgitation.  2. Endocardial visualization enhanced with intravenous  injection of Ultrasonic Enhancing Agent (Definity). Severe  global left ventricular systolic dysfunction.  3. The right ventricle is not well visualized; from limited  views, grossly normal right ventricular size and systolic  function.  *** Compared with echocardiogram of 9/12/2018, results are  similar on today's study.  ------------------------------------------------------------------------  Confirmed on  9/14/2018 - 15:43:25 by Darrel Marshall M.D.  ------------------------------------------------------------------------    < end of copied text >    [ ]  Catheterization:  [ ] Stress Test:    OTHER:

## 2018-09-15 NOTE — PROGRESS NOTE ADULT - SUBJECTIVE AND OBJECTIVE BOX
Visit Summary: Patient seen and evaluated at bedside.    Overnight Events: none    Exam:  T(C): 37 (09-14-18 @ 19:00), Max: 37.2 (09-14-18 @ 09:00)  HR: 94 (09-15-18 @ 00:45) (94 - 130)  BP: --  RR: 26 (09-15-18 @ 00:45) (15 - 28)  SpO2: 100% (09-15-18 @ 00:45) (87% - 100%)  Wt(kg): --    exam deferred                          9.0    17.9  )-----------( 168      ( 14 Sep 2018 23:12 )             26.6     09-14    151<H>  |  119<H>  |  18  ----------------------------<  127<H>  3.9   |  23  |  0.67    Ca    8.1<L>      14 Sep 2018 23:12  Phos  1.9     09-14  Mg     2.0     09-14    TPro  5.0<L>  /  Alb  2.6<L>  /  TBili  0.4  /  DBili  x   /  AST  86<H>  /  ALT  41  /  AlkPhos  47  09-13

## 2018-09-15 NOTE — PROGRESS NOTE ADULT - SUBJECTIVE AND OBJECTIVE BOX
32 yo F with no pmhx BIBEMS 9/12 to [facility] unconscious.  According to OSH and boyfriend, patient experienced WHOL while out to dinner and took Tylenol without relief.  Patient went to bed and boyfriend felt bed shaking to find patient unresponsive.  EMS called.  Patient Ambu bagged upon arrival to OSH with GCS 3t.  Patient transferred to [facility] for further management. CT head demonstrated diffuse MF4 SAH and CXR demonstrated ARDS. (12 Sep 2018 02:02)    9/12 ARDS/neurogenic pulm edema  9/12 PEA arrest, ROSC 4 min     Extensive discussion with the family - pt remains full code.    On admission, the patient was:  GCS: 3t.  Hunt-Chahal: 5  modified Epstein: 4      PAST MEDICAL HISTORY: No pertinent past medical history    PAST SURGICAL HISTORY: No significant past surgical history    FAMILY HISTORY:  No pertinent family history in first degree relatives    ALLERGIES: No Known Allergies    **************************************  **************************************    OVERNIGHT EVENTS: [] None    ROS  Unobtainable due to mental status[x] Negative []  Positives:        ICU Vital Signs Last 24 Hrs  T(C): 37 (14 Sep 2018 19:00), Max: 37.2 (14 Sep 2018 09:00)  T(F): 98.6 (14 Sep 2018 19:00), Max: 99 (14 Sep 2018 09:00)  HR: 94 (15 Sep 2018 00:45) (94 - 122)  ABP: 101/70 (15 Sep 2018 00:45) (80/73 - 122/75)  ABP(mean): 80 (15 Sep 2018 00:45) (61 - 90)  RR: 26 (15 Sep 2018 00:45) (15 - 28)  SpO2: 100% (15 Sep 2018 00:45) (92% - 100%)     09-13 @ 07:01  -  09-14 @ 07:00  --------------------------------------------------------  IN: 1657.3 mL / OUT: 1220 mL / NET: 437.3 mL    09-14 @ 07:01  -  09-15 @ 01:56  --------------------------------------------------------  IN: 1657.8 mL / OUT: 615 mL / NET: 1042.8 mL       Mode: AC/ CMV (Assist Control/ Continuous Mandatory Ventilation)  RR (machine): 26  TV (machine): 350  FiO2: 60  PEEP: 10  ITime: 1  MAP: 16  PIP: 26      DEVICES: [] Restraints [] PRISCILLA/HMV []LD [] ET tube [] Trach [] Chest Tube [x] A-line [x] Cole [] NGT [] Rectal Tube [] EVD [x] CVL  [] ICP/LiCOx    NEUROIMAGING:   < from: CT Brain Stroke Protocol (09.11.18 @ 22:22) >  CLINICAL HISTORY:  Unresponsiveness, seizure.      FINDINGS:      IMPRESSION:    Extensive subarachnoid hemorrhage. Small volume intraventricular   hemorrhage. In the absence of trauma, subarachnoid hemorrhage secondary   to aneurysm rupture is possible. CTA recommended    Thin subdural hemorrhages are difficult to exclude due to beam hardening   artifact. Follow-up recommended    Diffuse sulcal effacement is in part due to subarachnoid hemorrhage,   however cerebral edema may be possible as well. Early loss of gray-white   differentiation along the basal ganglia is also possible raising the   possibility of hypoxic ischemic brain injury. This can be further   evaluated on a brain MRI.    < end of copied text >        MEDICATIONS:  acetaminophen    Suspension .. 650 milliGRAM(s) Oral every 6 hours PRN  ampicillin/sulbactam  IVPB      ampicillin/sulbactam  IVPB 3 Gram(s) IV Intermittent every 6 hours  artificial tears (preservative free) Ophthalmic Solution 1 Drop(s) Both EYES every 8 hours  chlorhexidine 0.12% Liquid 15 milliLiter(s) Swish and Spit two times a day  influenza   Vaccine 0.5 milliLiter(s) IntraMuscular once  levETIRAcetam 1000 milliGRAM(s) Oral two times a day  milrinone Infusion 0.25 MICROgram(s)/kG/Min IV Continuous <Continuous>  norepinephrine Infusion 0.05 MICROgram(s)/kG/Min IV Continuous <Continuous>  pantoprazole  Injectable 40 milliGRAM(s) IV Push daily  potassium phosphate IVPB 30 milliMole(s) IV Intermittent once  vasopressin Infusion 0.03 Unit(s)/Min IV Continuous <Continuous>      PHYSICAL EXAM:  General:  Neurological:   Lungs:  Heart:  Abdomen:  Extremities:   Skin:      LABS:                        9.0    17.9  )-----------( 168      ( 14 Sep 2018 23:12 )             26.6    09-14    151<H>  |  119<H>  |  18  ----------------------------<  127<H>  3.9   |  23  |  0.67    Ca    8.1<L>      14 Sep 2018 23:12  Phos  1.9     09-14  Mg     2.0     09-14    TPro  5.0<L>  /  Alb  2.6<L>  /  TBili  0.4  /  DBili  x   /  AST  86<H>  /  ALT  41  /  AlkPhos  47  09-13   ABG - ( 14 Sep 2018 23:07 )  pH, Arterial: 7.43  pH, Blood: x     /  pCO2: 37    /  pO2: 173   / HCO3: 24    / Base Excess: .6    /  SaO2: 100                               09-13-18 @ 07:01 - 09-14-18 @ 07:00  --------------------------------------------------------  IN: 1657.3 mL / OUT: 1220 mL / NET: 437.3 mL    09-14-18 @ 07:01  -  09-15-18 @ 01:56  --------------------------------------------------------  IN: 1657.8 mL / OUT: 615 mL / NET: 1042.8 mL 32 yo F with no pmhx BIBEMS 9/12 to [place] unconscious.  According to OSH and boyfriend, patient experienced WHOL while out to dinner and took Tylenol without relief.  Patient went to bed and boyfriend felt bed shaking to find patient unresponsive.  EMS called.  Patient Ambu bagged upon arrival to OSH with GCS 3t.  Patient transferred to [facility] for further management. CT head demonstrated diffuse MF4 SAH and CXR demonstrated ARDS. (12 Sep 2018 02:02)    9/12 ARDS/neurogenic pulm edema  9/12 PEA arrest, ROSC 4 min     Extensive discussion with the family - pt remains full code.    On admission, the patient was:  GCS: 3t.  Hunt-Chahal: 5  modified Epstein: 4      PAST MEDICAL HISTORY: No pertinent past medical history    PAST SURGICAL HISTORY: No significant past surgical history    FAMILY HISTORY:  No pertinent family history in first degree relatives    ALLERGIES: No Known Allergies    **************************************  **************************************    OVERNIGHT EVENTS: [] None    ROS  Unobtainable due to mental status[x] Negative []  Positives:        ICU Vital Signs Last 24 Hrs  T(C): 37 (14 Sep 2018 19:00), Max: 37.2 (14 Sep 2018 09:00)  T(F): 98.6 (14 Sep 2018 19:00), Max: 99 (14 Sep 2018 09:00)  HR: 94 (15 Sep 2018 00:45) (94 - 122)  ABP: 101/70 (15 Sep 2018 00:45) (80/73 - 122/75)  ABP(mean): 80 (15 Sep 2018 00:45) (61 - 90)  RR: 26 (15 Sep 2018 00:45) (15 - 28)  SpO2: 100% (15 Sep 2018 00:45) (92% - 100%)     09-13 @ 07:01  -  09-14 @ 07:00  --------------------------------------------------------  IN: 1657.3 mL / OUT: 1220 mL / NET: 437.3 mL    09-14 @ 07:01  -  09-15 @ 01:56  --------------------------------------------------------  IN: 1657.8 mL / OUT: 615 mL / NET: 1042.8 mL       Mode: AC/ CMV (Assist Control/ Continuous Mandatory Ventilation)  RR (machine): 26  TV (machine): 350  FiO2: 60  PEEP: 10  ITime: 1  MAP: 16  PIP: 26      DEVICES: [] Restraints [] PRISCILLA/HMV []LD [] ET tube [] Trach [] Chest Tube [x] A-line [x] Cole [] NGT [] Rectal Tube [] EVD [x] CVL  [] ICP/LiCOx    NEUROIMAGING:   < from: CT Brain Stroke Protocol (09.11.18 @ 22:22) >  CLINICAL HISTORY:  Unresponsiveness, seizure.      FINDINGS:      IMPRESSION:    Extensive subarachnoid hemorrhage. Small volume intraventricular   hemorrhage. In the absence of trauma, subarachnoid hemorrhage secondary   to aneurysm rupture is possible. CTA recommended    Thin subdural hemorrhages are difficult to exclude due to beam hardening   artifact. Follow-up recommended    Diffuse sulcal effacement is in part due to subarachnoid hemorrhage,   however cerebral edema may be possible as well. Early loss of gray-white   differentiation along the basal ganglia is also possible raising the   possibility of hypoxic ischemic brain injury. This can be further   evaluated on a brain MRI.    < end of copied text >        MEDICATIONS:  acetaminophen    Suspension .. 650 milliGRAM(s) Oral every 6 hours PRN  ampicillin/sulbactam  IVPB      ampicillin/sulbactam  IVPB 3 Gram(s) IV Intermittent every 6 hours  artificial tears (preservative free) Ophthalmic Solution 1 Drop(s) Both EYES every 8 hours  chlorhexidine 0.12% Liquid 15 milliLiter(s) Swish and Spit two times a day  influenza   Vaccine 0.5 milliLiter(s) IntraMuscular once  levETIRAcetam 1000 milliGRAM(s) Oral two times a day  milrinone Infusion 0.25 MICROgram(s)/kG/Min IV Continuous <Continuous>  norepinephrine Infusion 0.05 MICROgram(s)/kG/Min IV Continuous <Continuous>  pantoprazole  Injectable 40 milliGRAM(s) IV Push daily  potassium phosphate IVPB 30 milliMole(s) IV Intermittent once  vasopressin Infusion 0.03 Unit(s)/Min IV Continuous <Continuous>          LABS:                        9.0    17.9  )-----------( 168      ( 14 Sep 2018 23:12 )             26.6    09-14    151<H>  |  119<H>  |  18  ----------------------------<  127<H>  3.9   |  23  |  0.67    Ca    8.1<L>      14 Sep 2018 23:12  Phos  1.9     09-14  Mg     2.0     09-14    TPro  5.0<L>  /  Alb  2.6<L>  /  TBili  0.4  /  DBili  x   /  AST  86<H>  /  ALT  41  /  AlkPhos  47  09-13   ABG - ( 14 Sep 2018 23:07 )  pH, Arterial: 7.43  pH, Blood: x     /  pCO2: 37    /  pO2: 173   / HCO3: 24    / Base Excess: .6    /  SaO2: 100         09-13-18 @ 07:01 - 09-14-18 @ 07:00  --------------------------------------------------------  IN: 1657.3 mL / OUT: 1220 mL / NET: 437.3 mL    09-14-18 @ 07:01  -  09-15-18 @ 01:56  --------------------------------------------------------  IN: 1657.8 mL / OUT: 615 mL / NET: 1042.8 mL    EXAMINATION:  HEENT:  3 mm pupils fixed, no dolls  Neuro:  no brainstem reflexes, no overbreathing either, no motors.  Cards:  tachycardic  Respiratory:  intubated  Abdomen:  soft  Extremities:  no edema  Skin:  warm/dry    ASSESSMENT/PLAN:  23 yo F with unremarkable PMH, presented with SAH HH5, mF4. Unknown source.  Cerebral edema, possible anoxic cerebral injury.  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  Neurogenic cardiomyopathy with depressed LV function 11%.  S/p cardiac arrest - PEA with ROSC 4 min.  Neurogenic pulmonary edema vs aspiration PNA. Severe ARDS.  Metabolic lactate acidosis - now corrected     NEURO:  comachecks q1hr  off sedation, PRN Fentanyl  S/p Mannitol, would consider albumin + Lasix or inhalation of NO if desaturates  normalize parameters for formal brain death exam - ABG improving  Vent - ARDS protocol  On  levo VAso 0.04 + Milrinone  Vanco + Unasyn for possible aspiration PNA  VTE prophylaxis: [x] SCDs [x] hold chemoprophylaxis due to SAH    CODE STATUS:  [x] Full Code [] DNR [] DNI [] Palliative/Comfort Care    DISPOSITION:  [x] ICU [] Stroke Unit [] Floor [] EMU [] RCU [] PCU    [x] Patient is at high risk of neurologic deterioration/death due to: cerebral herniation. SAH      Time spent: 30 critical care minutes

## 2018-09-15 NOTE — PROGRESS NOTE ADULT - SUBJECTIVE AND OBJECTIVE BOX
O: on multiple vasopressors  NE, vasopressin, and milrinone    T(C): 36.2 (09-15-18 @ 11:00), Max: 37.2 (09-14-18 @ 12:00)  HR: 95 (09-15-18 @ 11:30) (93 - 106)  BP: --  RR: 26 (09-15-18 @ 11:30) (23 - 26)  SpO2: 100% (09-15-18 @ 11:30) (100% - 100%)  09-14-18 @ 07:01  -  09-15-18 @ 07:00  --------------------------------------------------------  IN: 2926.3 mL / OUT: 925 mL / NET: 2001.3 mL    09-15-18 @ 07:01  -  09-15-18 @ 11:55  --------------------------------------------------------  IN: 302.8 mL / OUT: 155 mL / NET: 147.8 mL    acetaminophen    Suspension .. 650 milliGRAM(s) Oral every 6 hours PRN  ampicillin/sulbactam  IVPB      ampicillin/sulbactam  IVPB 3 Gram(s) IV Intermittent every 6 hours  artificial tears (preservative free) Ophthalmic Solution 1 Drop(s) Both EYES every 8 hours  chlorhexidine 0.12% Liquid 15 milliLiter(s) Swish and Spit two times a day  influenza   Vaccine 0.5 milliLiter(s) IntraMuscular once  levETIRAcetam 1000 milliGRAM(s) Oral two times a day  milrinone Infusion 0.25 MICROgram(s)/kG/Min IV Continuous <Continuous>  norepinephrine Infusion 0.05 MICROgram(s)/kG/Min IV Continuous <Continuous>  pantoprazole  Injectable 40 milliGRAM(s) IV Push daily  vasopressin Infusion 0.03 Unit(s)/Min IV Continuous <Continuous>  Mode: AC/ CMV (Assist Control/ Continuous Mandatory Ventilation), RR (machine): 26, TV (machine): 350, FiO2: 60, PEEP: 10, ITime: 0.8, MAP: 16, PIP: 26      EXAMINATION:  HEENT:  3 mm pupils fixed, no dolls  Neuro:  no corneal, no gag and cough, no response to noxious stimuli   Cards:  tachycardic  Respiratory:  intubated, good breath sounds bilaterally   Abdomen:  soft  Extremities:  no edema  Skin:  warm/dry      LABS:  Na: 151 (09-14 @ 23:12), 153 (09-13 @ 22:18), 157 (09-13 @ 03:54), 159 (09-12 @ 19:56)  K: 3.9 (09-14 @ 23:12), 3.8 (09-13 @ 22:18), 4.6 (09-13 @ 03:54), 4.7 (09-12 @ 19:56)  Cl: 119 (09-14 @ 23:12), 121 (09-13 @ 22:18), 122 (09-13 @ 03:54), 122 (09-12 @ 19:56)  CO2: 23 (09-14 @ 23:12), 25 (09-13 @ 22:18), 26 (09-13 @ 03:54), 27 (09-12 @ 19:56)  BUN: 18 (09-14 @ 23:12), 15 (09-13 @ 22:18), 12 (09-13 @ 03:54), 11 (09-12 @ 19:56)  Cr: 0.67 (09-14 @ 23:12), 0.82 (09-13 @ 22:18), 0.86 (09-13 @ 03:54), 1.12 (09-12 @ 19:56)  Glu: 127(09-14 @ 23:12), 147(09-13 @ 22:18), 171(09-13 @ 03:54), 133(09-12 @ 19:56)    Hgb: 9.0 (09-14 @ 23:12), 10.5 (09-13 @ 22:18), 13.0 (09-12 @ 19:56)  Hct: 26.6 (09-14 @ 23:12), 32.9 (09-13 @ 22:18), 42.2 (09-12 @ 19:56)  WBC: 17.9 (09-14 @ 23:12), 20.3 (09-13 @ 22:18), 25.4 (09-12 @ 19:56)  Plt: 168 (09-14 @ 23:12), 213 (09-13 @ 22:18), 367 (09-12 @ 19:56)    INR:   PTT:       ASSESSMENT/PLAN:  23 yo F with unremarkable PMH, presented with SAH HH5, mF4. Unknown source.  Cerebral edema, possible anoxic cerebral injury.  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  Neurogenic cardiomyopathy with depressed LV function 11%.  S/p cardiac arrest - PEA with ROSC 2 min.  Neurogenic pulmonary edema vs aspiration PNA. Severe ARDS.  Metabolic lactate acidosis  coma checks q1hr  off sedation, PRN Fentanyl   normalize parameters for formal brain death exam - ABG improving, switch to Plasmalyte - hypernatremic  Vent - ARDS protocol, decrease FiO2 to 50%, will get ABG, PEEP 10   GI on TF  On Jaya, VAso  + Milrinone due to hemodynamic instability  hydrocortisone 50 mg q 8 hrs given hypotension resistant to steroids, wean NE   ID: ? aspiration, on unasyn   She has sustained catastrophic bran injury. and might progress to brain death. we can not do apnea test as she is hemodynamically unstable, however exam is consistent with brain death, will get EEG brain death protocol   live on informed   VTE prophylaxis: [x] SCDs [x] hold chemoprophylaxis due to SAH    CODE STATUS:  [x] Full Code [] DNR [] DNI [] Palliative/Comfort Care    DISPOSITION:  [x] ICU [] Stroke Unit [] Floor [] EMU [] RCU [] PCU    [x] Patient is at high risk of neurologic deterioration/death due to: cerebral herniation. SAH      Time spent: 45 critical care minutes    Contact: [phone] O: on multiple vasopressors  NE, vasopressin, and milrinone    T(C): 36.2 (09-15-18 @ 11:00), Max: 37.2 (09-14-18 @ 12:00)  HR: 95 (09-15-18 @ 11:30) (93 - 106)  BP: --  RR: 26 (09-15-18 @ 11:30) (23 - 26)  SpO2: 100% (09-15-18 @ 11:30) (100% - 100%)  09-14-18 @ 07:01  -  09-15-18 @ 07:00  --------------------------------------------------------  IN: 2926.3 mL / OUT: 925 mL / NET: 2001.3 mL    09-15-18 @ 07:01  -  09-15-18 @ 11:55  --------------------------------------------------------  IN: 302.8 mL / OUT: 155 mL / NET: 147.8 mL    acetaminophen    Suspension .. 650 milliGRAM(s) Oral every 6 hours PRN  ampicillin/sulbactam  IVPB      ampicillin/sulbactam  IVPB 3 Gram(s) IV Intermittent every 6 hours  artificial tears (preservative free) Ophthalmic Solution 1 Drop(s) Both EYES every 8 hours  chlorhexidine 0.12% Liquid 15 milliLiter(s) Swish and Spit two times a day  influenza   Vaccine 0.5 milliLiter(s) IntraMuscular once  levETIRAcetam 1000 milliGRAM(s) Oral two times a day  milrinone Infusion 0.25 MICROgram(s)/kG/Min IV Continuous <Continuous>  norepinephrine Infusion 0.05 MICROgram(s)/kG/Min IV Continuous <Continuous>  pantoprazole  Injectable 40 milliGRAM(s) IV Push daily  vasopressin Infusion 0.03 Unit(s)/Min IV Continuous <Continuous>  Mode: AC/ CMV (Assist Control/ Continuous Mandatory Ventilation), RR (machine): 26, TV (machine): 350, FiO2: 60, PEEP: 10, ITime: 0.8, MAP: 16, PIP: 26      EXAMINATION:  HEENT:  3 mm pupils fixed, no dolls  Neuro:  no corneal, no gag and cough, no response to noxious stimuli   Cards:  tachycardic  Respiratory:  intubated, good breath sounds bilaterally   Abdomen:  soft  Extremities:  no edema  Skin:  warm/dry      LABS:  Na: 151 (09-14 @ 23:12), 153 (09-13 @ 22:18), 157 (09-13 @ 03:54), 159 (09-12 @ 19:56)  K: 3.9 (09-14 @ 23:12), 3.8 (09-13 @ 22:18), 4.6 (09-13 @ 03:54), 4.7 (09-12 @ 19:56)  Cl: 119 (09-14 @ 23:12), 121 (09-13 @ 22:18), 122 (09-13 @ 03:54), 122 (09-12 @ 19:56)  CO2: 23 (09-14 @ 23:12), 25 (09-13 @ 22:18), 26 (09-13 @ 03:54), 27 (09-12 @ 19:56)  BUN: 18 (09-14 @ 23:12), 15 (09-13 @ 22:18), 12 (09-13 @ 03:54), 11 (09-12 @ 19:56)  Cr: 0.67 (09-14 @ 23:12), 0.82 (09-13 @ 22:18), 0.86 (09-13 @ 03:54), 1.12 (09-12 @ 19:56)  Glu: 127(09-14 @ 23:12), 147(09-13 @ 22:18), 171(09-13 @ 03:54), 133(09-12 @ 19:56)    Hgb: 9.0 (09-14 @ 23:12), 10.5 (09-13 @ 22:18), 13.0 (09-12 @ 19:56)  Hct: 26.6 (09-14 @ 23:12), 32.9 (09-13 @ 22:18), 42.2 (09-12 @ 19:56)  WBC: 17.9 (09-14 @ 23:12), 20.3 (09-13 @ 22:18), 25.4 (09-12 @ 19:56)  Plt: 168 (09-14 @ 23:12), 213 (09-13 @ 22:18), 367 (09-12 @ 19:56)    INR:   PTT:       ASSESSMENT/PLAN:  21 yo F with unremarkable PMH, presented with SAH HH5, mF4. Unknown source.  Cerebral edema, possible anoxic cerebral injury.  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  Neurogenic cardiomyopathy with depressed LV function 11%.  S/p cardiac arrest - PEA with ROSC 2 min.  Neurogenic pulmonary edema vs aspiration PNA. Severe ARDS.  Metabolic lactate acidosis  coma checks q1hr  off sedation, PRN Fentanyl   normalize parameters for formal brain death exam - ABG improving, switch to Plasmalyte - hypernatremic  Vent - ARDS protocol, decrease FiO2 to 50%, will get ABG, PEEP 10   GI on TF  cardiogenic shock, On Jaya, VAso  + Milrinone due to hemodynamic instability  hydrocortisone 50 mg q 8 hrs given hypotension resistant to steroids, wean NE   ID: ? aspiration, on unasyn   She has sustained catastrophic bran injury. and might progress to brain death. we can not do apnea test as she is hemodynamically unstable, however exam is consistent with brain death, will get EEG brain death protocol   live on informed   VTE prophylaxis: [x] SCDs [x] hold chemoprophylaxis due to SAH    CODE STATUS:  [x] Full Code [] DNR [] DNI [] Palliative/Comfort Care    DISPOSITION:  [x] ICU [] Stroke Unit [] Floor [] EMU [] RCU [] PCU    [x] Patient is at high risk of neurologic deterioration/death due to: cerebral herniation. SAH      Time spent: 45 critical care minutes    Contact: [phone]

## 2018-09-16 LAB
ANION GAP SERPL CALC-SCNC: 11 MMOL/L — SIGNIFICANT CHANGE UP (ref 5–17)
BUN SERPL-MCNC: 19 MG/DL — SIGNIFICANT CHANGE UP (ref 7–23)
CALCIUM SERPL-MCNC: 8.5 MG/DL — SIGNIFICANT CHANGE UP (ref 8.4–10.5)
CHLORIDE SERPL-SCNC: 116 MMOL/L — HIGH (ref 96–108)
CO2 SERPL-SCNC: 23 MMOL/L — SIGNIFICANT CHANGE UP (ref 22–31)
CREAT SERPL-MCNC: 0.55 MG/DL — SIGNIFICANT CHANGE UP (ref 0.5–1.3)
GAS PNL BLDA: SIGNIFICANT CHANGE UP
GLUCOSE SERPL-MCNC: 109 MG/DL — HIGH (ref 70–99)
HCT VFR BLD CALC: 27.2 % — LOW (ref 34.5–45)
HGB BLD-MCNC: 8.6 G/DL — LOW (ref 11.5–15.5)
MAGNESIUM SERPL-MCNC: 2.3 MG/DL — SIGNIFICANT CHANGE UP (ref 1.6–2.6)
MCHC RBC-ENTMCNC: 29.5 PG — SIGNIFICANT CHANGE UP (ref 27–34)
MCHC RBC-ENTMCNC: 31.7 GM/DL — LOW (ref 32–36)
MCV RBC AUTO: 93.1 FL — SIGNIFICANT CHANGE UP (ref 80–100)
OSMOLALITY UR: 150 MOS/KG — LOW (ref 300–900)
PHOSPHATE SERPL-MCNC: 3.1 MG/DL — SIGNIFICANT CHANGE UP (ref 2.5–4.5)
PLATELET # BLD AUTO: 173 K/UL — SIGNIFICANT CHANGE UP (ref 150–400)
POTASSIUM SERPL-MCNC: 3.5 MMOL/L — SIGNIFICANT CHANGE UP (ref 3.5–5.3)
POTASSIUM SERPL-SCNC: 3.5 MMOL/L — SIGNIFICANT CHANGE UP (ref 3.5–5.3)
RBC # BLD: 2.92 M/UL — LOW (ref 3.8–5.2)
RBC # FLD: 12.5 % — SIGNIFICANT CHANGE UP (ref 10.3–14.5)
SODIUM SERPL-SCNC: 150 MMOL/L — HIGH (ref 135–145)
SP GR UR STRIP: 1 — LOW (ref 1.01–1.02)
WBC # BLD: 15.4 K/UL — HIGH (ref 3.8–10.5)
WBC # FLD AUTO: 15.4 K/UL — HIGH (ref 3.8–10.5)

## 2018-09-16 PROCEDURE — 99291 CRITICAL CARE FIRST HOUR: CPT | Mod: 25

## 2018-09-16 PROCEDURE — 99292 CRITICAL CARE ADDL 30 MIN: CPT | Mod: 25

## 2018-09-16 PROCEDURE — 36620 INSERTION CATHETER ARTERY: CPT

## 2018-09-16 PROCEDURE — 99233 SBSQ HOSP IP/OBS HIGH 50: CPT

## 2018-09-16 PROCEDURE — 71045 X-RAY EXAM CHEST 1 VIEW: CPT | Mod: 26

## 2018-09-16 RX ORDER — VASOPRESSIN 20 [USP'U]/ML
0.04 INJECTION INTRAVENOUS
Qty: 100 | Refills: 0 | Status: DISCONTINUED | OUTPATIENT
Start: 2018-09-16 | End: 2018-09-16

## 2018-09-16 RX ORDER — ENOXAPARIN SODIUM 100 MG/ML
40 INJECTION SUBCUTANEOUS
Refills: 0 | Status: DISCONTINUED | OUTPATIENT
Start: 2018-09-16 | End: 2018-09-17

## 2018-09-16 RX ORDER — SODIUM CHLORIDE 9 MG/ML
500 INJECTION INTRAMUSCULAR; INTRAVENOUS; SUBCUTANEOUS ONCE
Refills: 0 | Status: COMPLETED | OUTPATIENT
Start: 2018-09-16 | End: 2018-09-16

## 2018-09-16 RX ORDER — VASOPRESSIN 20 [USP'U]/ML
0.06 INJECTION INTRAVENOUS
Qty: 100 | Refills: 0 | Status: DISCONTINUED | OUTPATIENT
Start: 2018-09-16 | End: 2018-09-19

## 2018-09-16 RX ORDER — FLUCONAZOLE 150 MG/1
150 TABLET ORAL ONCE
Refills: 0 | Status: COMPLETED | OUTPATIENT
Start: 2018-09-16 | End: 2018-09-16

## 2018-09-16 RX ORDER — LEVOTHYROXINE SODIUM 125 MCG
20 TABLET ORAL
Qty: 200 | Refills: 0 | Status: DISCONTINUED | OUTPATIENT
Start: 2018-09-16 | End: 2018-09-19

## 2018-09-16 RX ADMIN — Medication 50 MILLIGRAM(S): at 05:12

## 2018-09-16 RX ADMIN — AMPICILLIN SODIUM AND SULBACTAM SODIUM 200 GRAM(S): 250; 125 INJECTION, POWDER, FOR SUSPENSION INTRAMUSCULAR; INTRAVENOUS at 05:12

## 2018-09-16 RX ADMIN — LEVETIRACETAM 1000 MILLIGRAM(S): 250 TABLET, FILM COATED ORAL at 17:24

## 2018-09-16 RX ADMIN — CHLORHEXIDINE GLUCONATE 15 MILLILITER(S): 213 SOLUTION TOPICAL at 05:14

## 2018-09-16 RX ADMIN — VASOPRESSIN 1.8 UNIT(S)/MIN: 20 INJECTION INTRAVENOUS at 06:38

## 2018-09-16 RX ADMIN — PANTOPRAZOLE SODIUM 40 MILLIGRAM(S): 20 TABLET, DELAYED RELEASE ORAL at 11:46

## 2018-09-16 RX ADMIN — CHLORHEXIDINE GLUCONATE 15 MILLILITER(S): 213 SOLUTION TOPICAL at 17:24

## 2018-09-16 RX ADMIN — AMPICILLIN SODIUM AND SULBACTAM SODIUM 200 GRAM(S): 250; 125 INJECTION, POWDER, FOR SUSPENSION INTRAMUSCULAR; INTRAVENOUS at 11:45

## 2018-09-16 RX ADMIN — Medication 1 DROP(S): at 05:13

## 2018-09-16 RX ADMIN — AMPICILLIN SODIUM AND SULBACTAM SODIUM 200 GRAM(S): 250; 125 INJECTION, POWDER, FOR SUSPENSION INTRAMUSCULAR; INTRAVENOUS at 00:17

## 2018-09-16 RX ADMIN — FLUCONAZOLE 150 MILLIGRAM(S): 150 TABLET ORAL at 06:38

## 2018-09-16 RX ADMIN — Medication 50 MILLIGRAM(S): at 21:08

## 2018-09-16 RX ADMIN — SODIUM CHLORIDE 1000 MILLILITER(S): 9 INJECTION INTRAMUSCULAR; INTRAVENOUS; SUBCUTANEOUS at 12:45

## 2018-09-16 RX ADMIN — Medication 1 DROP(S): at 13:41

## 2018-09-16 RX ADMIN — Medication 1 DROP(S): at 21:08

## 2018-09-16 RX ADMIN — Medication 50 MILLIGRAM(S): at 13:40

## 2018-09-16 RX ADMIN — ENOXAPARIN SODIUM 40 MILLIGRAM(S): 100 INJECTION SUBCUTANEOUS at 17:24

## 2018-09-16 RX ADMIN — Medication 3.16 MICROGRAM(S)/KG/MIN: at 05:11

## 2018-09-16 RX ADMIN — MILRINONE LACTATE 5.05 MICROGRAM(S)/KG/MIN: 1 INJECTION, SOLUTION INTRAVENOUS at 06:39

## 2018-09-16 RX ADMIN — LEVETIRACETAM 1000 MILLIGRAM(S): 250 TABLET, FILM COATED ORAL at 05:13

## 2018-09-16 NOTE — PROGRESS NOTE ADULT - PROBLEM SELECTOR PLAN 4
Family meeting held with patient's parents. Patient's mother did all of the speaking while her father was present but quiet.  Mother expresses that they are remaining hopeful and positive regarding patient's condition, and do not want to consider any negative thoughts or discussion about proving patient to be brain dead.  Rather, they want efforts to be focused on proving that there is still life in John's brain. The more the team discusses proving brain death, the more they feel that the team is givin up on Jamecia.  They had heard initially that 1 week would be given to assess for improvement in patient's condition and are focused on keeping hopeful for at least 1 week, if not more.  Patient's mother expresses that she must advocate for her daughter, who cannot advocate for herself.      They find strength and comfort in prayer, and hope in God providing them a miracle.  Chaplaincy has met with them and this has been helpful. Visits should continue.     Additionally, patient's mother has a 9yo son who is here with her and she asks that no discussions regarding the patient's condition be held with him present so as to not traumatize him.     Emotional support provided to patient's parents. Family meeting held with patient's parents. Patient's mother did all of the speaking while her father was present but quiet.  Mother expresses that they are remaining hopeful and positive regarding patient's condition, and do not want to consider any negative thoughts or discussion about proving patient to be brain dead.  Rather, they want efforts to be focused on proving that there is still life in Johnecia's brain. The more the team discusses proving brain death, the more they feel that the team is giving up on Jamecia.  Mother states that they were initially told that 1 week would be given to assess for improvement in patient's condition and so they are focused on waiting the full week, if not more.  Patient's mother expresses that she must advocate for her daughter, who cannot advocate for herself.      They find strength and comfort in prayer, and hope in God providing them a miracle.  Chaplaincy has met with them and this has been helpful. Visits should continue.     Additionally, patient's mother has a 9yo son who is here with her and she asks that no discussions regarding the patient's condition be held with him present so as to not traumatize him.     Emotional support provided to patient's parents.

## 2018-09-16 NOTE — PROGRESS NOTE ADULT - SUBJECTIVE AND OBJECTIVE BOX
Summary:   22 year-old woman admitted 9/12/18 with HH5 mF4 subarachnoid hemorrhage complicated by neurogenic stunned myocardium, neurogenic pulmonary edema, aspiration pneumonitis, ARDS and shock (presumed cardiogenic and septic) requiring multiple pressors. Sustained 2 minute PEA arrest. Exam consistent with brain death, but too unstable for apnea testing and ancillary testing (TCDs/EEG) technically difficult and thus inconclusive. Started on steroid and levothyroxine.    24-Hour Events:  Went into DI. Hypothermic, placed on warming blanket.    Vitals/Data/Orders: [x] Reviewed    Examination:  Pupils 3mm fixed, no corneals, no vestibuloocular reflex, no cough, no gag, flaccid quadriplegia

## 2018-09-16 NOTE — PROGRESS NOTE ADULT - SUBJECTIVE AND OBJECTIVE BOX
O: remains on 3 pressers, not breathing above the vent    T(C): 36.5 (09-16-18 @ 07:00), Max: 37.4 (09-15-18 @ 23:00)  HR: 94 (09-16-18 @ 10:30) (92 - 111)  BP: 123/90 (09-16-18 @ 10:15) (108/90 - 123/90)  RR: 26 (09-16-18 @ 10:30) (19 - 26)  SpO2: 100% (09-16-18 @ 10:30) (97% - 100%)  09-15-18 @ 07:01  -  09-16-18 @ 07:00  --------------------------------------------------------  IN: 3109.6 mL / OUT: 1270 mL / NET: 1839.6 mL    09-16-18 @ 07:01  -  09-16-18 @ 11:57  --------------------------------------------------------  IN: 204.3 mL / OUT: 120 mL / NET: 84.3 mL    acetaminophen    Suspension .. 650 milliGRAM(s) Oral every 6 hours PRN  ampicillin/sulbactam  IVPB      ampicillin/sulbactam  IVPB 3 Gram(s) IV Intermittent every 6 hours  artificial tears (preservative free) Ophthalmic Solution 1 Drop(s) Both EYES every 8 hours  chlorhexidine 0.12% Liquid 15 milliLiter(s) Swish and Spit two times a day  hydrocortisone sodium succinate Injectable 50 milliGRAM(s) IV Push every 8 hours  influenza   Vaccine 0.5 milliLiter(s) IntraMuscular once  levETIRAcetam 1000 milliGRAM(s) Oral two times a day  milrinone Infusion 0.25 MICROgram(s)/kG/Min IV Continuous <Continuous>  norepinephrine Infusion 0.05 MICROgram(s)/kG/Min IV Continuous <Continuous>  pantoprazole  Injectable 40 milliGRAM(s) IV Push daily  vasopressin Infusion 0.03 Unit(s)/Min IV Continuous <Continuous>  Mode: AC/ CMV (Assist Control/ Continuous Mandatory Ventilation), RR (machine): 26, TV (machine): 350, FiO2: 50, PEEP: 10, ITime: 0.8, MAP: 16, PIP: 25    EXAMINATION:  HEENT:  3 mm pupils fixed, no dolls  Neuro:  no corneal, no gag and cough, no response to noxious stimuli   Cards:  tachycardic  Respiratory:  intubated, good breath sounds bilaterally   Abdomen:  soft  Extremities:  no edema  Skin:  warm/dry      LABS:  Na: 151 (09-14 @ 23:12), 153 (09-13 @ 22:18), 157 (09-13 @ 03:54), 159 (09-12 @ 19:56)  K: 3.9 (09-14 @ 23:12), 3.8 (09-13 @ 22:18), 4.6 (09-13 @ 03:54), 4.7 (09-12 @ 19:56)  Cl: 119 (09-14 @ 23:12), 121 (09-13 @ 22:18), 122 (09-13 @ 03:54), 122 (09-12 @ 19:56)  CO2: 23 (09-14 @ 23:12), 25 (09-13 @ 22:18), 26 (09-13 @ 03:54), 27 (09-12 @ 19:56)  BUN: 18 (09-14 @ 23:12), 15 (09-13 @ 22:18), 12 (09-13 @ 03:54), 11 (09-12 @ 19:56)  Cr: 0.67 (09-14 @ 23:12), 0.82 (09-13 @ 22:18), 0.86 (09-13 @ 03:54), 1.12 (09-12 @ 19:56)  Glu: 127(09-14 @ 23:12), 147(09-13 @ 22:18), 171(09-13 @ 03:54), 133(09-12 @ 19:56)    Hgb: 9.0 (09-14 @ 23:12), 10.5 (09-13 @ 22:18), 13.0 (09-12 @ 19:56)  Hct: 26.6 (09-14 @ 23:12), 32.9 (09-13 @ 22:18), 42.2 (09-12 @ 19:56)  WBC: 17.9 (09-14 @ 23:12), 20.3 (09-13 @ 22:18), 25.4 (09-12 @ 19:56)  Plt: 168 (09-14 @ 23:12), 213 (09-13 @ 22:18), 367 (09-12 @ 19:56)    INR:   PTT:       ASSESSMENT/PLAN:  23 yo F with unremarkable PMH, presented with [facility] HH5, mF4. Unknown source.  Cerebral edema, possible anoxic cerebral injury.  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  Neurogenic cardiomyopathy with depressed LV function 11%.  S/p cardiac arrest - PEA with ROSC 2 min.  Neurogenic pulmonary edema vs aspiration PNA. Severe ARDS.  Metabolic lactate acidosis  coma checks q1hr  off sedation, PRN Fentanyl   normalize parameters for formal brain death exam - ABG improving, switch to Plasmalyte - hypernatremic  Vent - ARDS protocol, decrease FiO2 to 50%, will get ABG, PEEP 10   GI on TF  cardiogenic shock, On Jaya, VAso  + Milrinone due to hemodynamic instability  hydrocortisone 50 mg q 8 hrs given hypotension resistant to steroids, wean NE   ID: ? aspiration, on unasyn   She has sustained catastrophic bran injury. and might progress to brain death. we can not do apnea test as she is hemodynamically unstable, however exam is consistent with brain death, will get EEG brain death protocol   live on informed   VTE prophylaxis: [x] SCDs [x] hold chemoprophylaxis due to SAH    CODE STATUS:  [x] Full Code [] DNR [] DNI [] Palliative/Comfort Care    DISPOSITION:  [x] ICU [] Stroke Unit [] Floor [] EMU [] RCU [] PCU    [x] Patient is at high risk of neurologic deterioration/death due to: cerebral herniation. [facility]      Time spent: 45 critical care minutes    Contact: [phone] O: remains on 3 pressers, not breathing above the vent    T(C): 36.5 (09-16-18 @ 07:00), Max: 37.4 (09-15-18 @ 23:00)  HR: 94 (09-16-18 @ 10:30) (92 - 111)  BP: 123/90 (09-16-18 @ 10:15) (108/90 - 123/90)  RR: 26 (09-16-18 @ 10:30) (19 - 26)  SpO2: 100% (09-16-18 @ 10:30) (97% - 100%)  09-15-18 @ 07:01  -  09-16-18 @ 07:00  --------------------------------------------------------  IN: 3109.6 mL / OUT: 1270 mL / NET: 1839.6 mL    09-16-18 @ 07:01  -  09-16-18 @ 11:57  --------------------------------------------------------  IN: 204.3 mL / OUT: 120 mL / NET: 84.3 mL    acetaminophen    Suspension .. 650 milliGRAM(s) Oral every 6 hours PRN  ampicillin/sulbactam  IVPB      ampicillin/sulbactam  IVPB 3 Gram(s) IV Intermittent every 6 hours  artificial tears (preservative free) Ophthalmic Solution 1 Drop(s) Both EYES every 8 hours  chlorhexidine 0.12% Liquid 15 milliLiter(s) Swish and Spit two times a day  hydrocortisone sodium succinate Injectable 50 milliGRAM(s) IV Push every 8 hours  influenza   Vaccine 0.5 milliLiter(s) IntraMuscular once  levETIRAcetam 1000 milliGRAM(s) Oral two times a day  milrinone Infusion 0.25 MICROgram(s)/kG/Min IV Continuous <Continuous>  norepinephrine Infusion 0.05 MICROgram(s)/kG/Min IV Continuous <Continuous>  pantoprazole  Injectable 40 milliGRAM(s) IV Push daily  vasopressin Infusion 0.03 Unit(s)/Min IV Continuous <Continuous>  Mode: AC/ CMV (Assist Control/ Continuous Mandatory Ventilation), RR (machine): 26, TV (machine): 350, FiO2: 50, PEEP: 10, ITime: 0.8, MAP: 16, PIP: 25    EXAMINATION:  HEENT:  3 mm pupils fixed, no dolls  Neuro:  no corneal, no gag and cough, no response to noxious stimuli   Cards:  tachycardic  Respiratory:  intubated, good breath sounds bilaterally   Abdomen:  soft  Extremities:  no edema  Skin:  warm/dry      LABS:  Na: 151 (09-14 @ 23:12), 153 (09-13 @ 22:18), 157 (09-13 @ 03:54), 159 (09-12 @ 19:56)  K: 3.9 (09-14 @ 23:12), 3.8 (09-13 @ 22:18), 4.6 (09-13 @ 03:54), 4.7 (09-12 @ 19:56)  Cl: 119 (09-14 @ 23:12), 121 (09-13 @ 22:18), 122 (09-13 @ 03:54), 122 (09-12 @ 19:56)  CO2: 23 (09-14 @ 23:12), 25 (09-13 @ 22:18), 26 (09-13 @ 03:54), 27 (09-12 @ 19:56)  BUN: 18 (09-14 @ 23:12), 15 (09-13 @ 22:18), 12 (09-13 @ 03:54), 11 (09-12 @ 19:56)  Cr: 0.67 (09-14 @ 23:12), 0.82 (09-13 @ 22:18), 0.86 (09-13 @ 03:54), 1.12 (09-12 @ 19:56)  Glu: 127(09-14 @ 23:12), 147(09-13 @ 22:18), 171(09-13 @ 03:54), 133(09-12 @ 19:56)    Hgb: 9.0 (09-14 @ 23:12), 10.5 (09-13 @ 22:18), 13.0 (09-12 @ 19:56)  Hct: 26.6 (09-14 @ 23:12), 32.9 (09-13 @ 22:18), 42.2 (09-12 @ 19:56)  WBC: 17.9 (09-14 @ 23:12), 20.3 (09-13 @ 22:18), 25.4 (09-12 @ 19:56)  Plt: 168 (09-14 @ 23:12), 213 (09-13 @ 22:18), 367 (09-12 @ 19:56)    INR:   PTT:       ASSESSMENT/PLAN:  21 yo F with unremarkable PMH, presented with [facility] HH5, mF4. Unknown source.  Cerebral edema, possible anoxic cerebral injury.  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  Neurogenic cardiomyopathy with depressed LV function 11%.  S/p cardiac arrest - PEA with ROSC 2 min.  Neurogenic pulmonary edema vs aspiration PNA. Severe ARDS.  coma checks q4hr  palliative consult   off sedation, keep Fentanyl off    hypotension, cardiogenic shock: on 3 pressors, we are weaning NE, NS bolus 500 ml,   Vent - ARDS improved, decrease FiO2 to 50%,  decrease PEEP to 5, ABG tonight, CXR improved   GI on TF, free water flushes 300 ml q 6 hrs   cardiogenic shock, On Jaya, VAso  + Milrinone due to hemodynamic instability  hydrocortisone 50 mg q 8 hrs given hypotension resistant to pressors    synthroid 200 mcg in 500 ml saline, 20 mcg /hr, 50 ml/hr   ID: ? aspiration, on unasyn, however no signs of infection, no sputum, no infiltrates, if fever, will pan culture   She has sustained catastrophic bran injury. and might progress to brain death. we can not do apnea test as she is hemodynamically unstable, however exam is consistent with brain death, will get EEG brain death protocol   live on informed   VTE prophylaxis: [x] SCDs [x] hold chemoprophylaxis lovenox 40 mg sc     CODE STATUS:  [x] Full Code [] DNR [] DNI [] Palliative/Comfort Care    DISPOSITION:  [x] ICU [] Stroke Unit [] Floor [] EMU [] RCU [] PCU    [x] Patient is at high risk of neurologic deterioration/death due to: cerebral herniation. [facility]      Time spent: 45 critical care minutes    Contact: [phone] O: remains on 3 pressers, not breathing above the vent    T(C): 36.5 (09-16-18 @ 07:00), Max: 37.4 (09-15-18 @ 23:00)  HR: 94 (09-16-18 @ 10:30) (92 - 111)  BP: 123/90 (09-16-18 @ 10:15) (108/90 - 123/90)  RR: 26 (09-16-18 @ 10:30) (19 - 26)  SpO2: 100% (09-16-18 @ 10:30) (97% - 100%)  09-15-18 @ 07:01  -  09-16-18 @ 07:00  --------------------------------------------------------  IN: 3109.6 mL / OUT: 1270 mL / NET: 1839.6 mL    09-16-18 @ 07:01  -  09-16-18 @ 11:57  --------------------------------------------------------  IN: 204.3 mL / OUT: 120 mL / NET: 84.3 mL    acetaminophen    Suspension .. 650 milliGRAM(s) Oral every 6 hours PRN  ampicillin/sulbactam  IVPB      ampicillin/sulbactam  IVPB 3 Gram(s) IV Intermittent every 6 hours  artificial tears (preservative free) Ophthalmic Solution 1 Drop(s) Both EYES every 8 hours  chlorhexidine 0.12% Liquid 15 milliLiter(s) Swish and Spit two times a day  hydrocortisone sodium succinate Injectable 50 milliGRAM(s) IV Push every 8 hours  influenza   Vaccine 0.5 milliLiter(s) IntraMuscular once  levETIRAcetam 1000 milliGRAM(s) Oral two times a day  milrinone Infusion 0.25 MICROgram(s)/kG/Min IV Continuous <Continuous>  norepinephrine Infusion 0.05 MICROgram(s)/kG/Min IV Continuous <Continuous>  pantoprazole  Injectable 40 milliGRAM(s) IV Push daily  vasopressin Infusion 0.03 Unit(s)/Min IV Continuous <Continuous>  Mode: AC/ CMV (Assist Control/ Continuous Mandatory Ventilation), RR (machine): 26, TV (machine): 350, FiO2: 50, PEEP: 10, ITime: 0.8, MAP: 16, PIP: 25    EXAMINATION:  HEENT:  3 mm pupils fixed, no dolls  Neuro:  no corneal, no gag and cough, no response to noxious stimuli   Cards:  tachycardic  Respiratory:  intubated, good breath sounds bilaterally   Abdomen:  soft  Extremities:  no edema  Skin:  warm/dry      LABS:  Na: 151 (09-14 @ 23:12), 153 (09-13 @ 22:18), 157 (09-13 @ 03:54), 159 (09-12 @ 19:56)  K: 3.9 (09-14 @ 23:12), 3.8 (09-13 @ 22:18), 4.6 (09-13 @ 03:54), 4.7 (09-12 @ 19:56)  Cl: 119 (09-14 @ 23:12), 121 (09-13 @ 22:18), 122 (09-13 @ 03:54), 122 (09-12 @ 19:56)  CO2: 23 (09-14 @ 23:12), 25 (09-13 @ 22:18), 26 (09-13 @ 03:54), 27 (09-12 @ 19:56)  BUN: 18 (09-14 @ 23:12), 15 (09-13 @ 22:18), 12 (09-13 @ 03:54), 11 (09-12 @ 19:56)  Cr: 0.67 (09-14 @ 23:12), 0.82 (09-13 @ 22:18), 0.86 (09-13 @ 03:54), 1.12 (09-12 @ 19:56)  Glu: 127(09-14 @ 23:12), 147(09-13 @ 22:18), 171(09-13 @ 03:54), 133(09-12 @ 19:56)    Hgb: 9.0 (09-14 @ 23:12), 10.5 (09-13 @ 22:18), 13.0 (09-12 @ 19:56)  Hct: 26.6 (09-14 @ 23:12), 32.9 (09-13 @ 22:18), 42.2 (09-12 @ 19:56)  WBC: 17.9 (09-14 @ 23:12), 20.3 (09-13 @ 22:18), 25.4 (09-12 @ 19:56)  Plt: 168 (09-14 @ 23:12), 213 (09-13 @ 22:18), 367 (09-12 @ 19:56)    INR:   PTT:       ASSESSMENT/PLAN:  23 yo F with unremarkable PMH, presented with [facility] HH5, mF4. Unknown source.  Cerebral edema, possible anoxic cerebral injury.  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  Neurogenic cardiomyopathy with depressed LV function 11%.  S/p cardiac arrest - PEA with ROSC 2 min.  Neurogenic pulmonary edema vs aspiration PNA. Severe ARDS.  coma checks q4hr  EEG is inconclusive, will need ancillary testing once more stable   palliative consult   off sedation, keep Fentanyl off    hypotension, cardiogenic shock: on 3 pressors, we are weaning NE, NS bolus 500 ml,   Vent - ARDS improved, decrease FiO2 to 50%,  decrease PEEP to 5, ABG tonight, CXR improved   GI on TF, free water flushes 300 ml q 6 hrs   cardiogenic shock, On Jaya, VAso  + Milrinone due to hemodynamic instability  hydrocortisone 50 mg q 8 hrs given hypotension resistant to pressors    synthroid 200 mcg in 500 ml saline, 20 mcg /hr, 50 ml/hr   ID: ? aspiration, on unasyn, however no signs of infection, no sputum, no infiltrates, if fever, will pan culture   She has sustained catastrophic bran injury. and might progress to brain death. we can not do apnea test as she is hemodynamically unstable, however exam is consistent with brain death, will get EEG brain death protocol   live on informed   VTE prophylaxis: [x] SCDs [x] hold chemoprophylaxis lovenox 40 mg sc     CODE STATUS:  [x] Full Code [] DNR [] DNI [] Palliative/Comfort Care    DISPOSITION:  [x] ICU [] Stroke Unit [] Floor [] EMU [] RCU [] PCU    [x] Patient is at high risk of neurologic deterioration/death due to: cerebral herniation. [facility]      Time spent: 45 critical care minutes    Contact: [phone]

## 2018-09-16 NOTE — PROGRESS NOTE ADULT - SUBJECTIVE AND OBJECTIVE BOX
Subjective: Patient seen and examined. No new events except as noted.   status quo     REVIEW OF SYSTEMS:    Unable to obtain     MEDICATIONS:  MEDICATIONS  (STANDING):  ampicillin/sulbactam  IVPB      ampicillin/sulbactam  IVPB 3 Gram(s) IV Intermittent every 6 hours  artificial tears (preservative free) Ophthalmic Solution 1 Drop(s) Both EYES every 8 hours  chlorhexidine 0.12% Liquid 15 milliLiter(s) Swish and Spit two times a day  hydrocortisone sodium succinate Injectable 50 milliGRAM(s) IV Push every 8 hours  influenza   Vaccine 0.5 milliLiter(s) IntraMuscular once  levETIRAcetam 1000 milliGRAM(s) Oral two times a day  milrinone Infusion 0.25 MICROgram(s)/kG/Min (5.055 mL/Hr) IV Continuous <Continuous>  norepinephrine Infusion 0.05 MICROgram(s)/kG/Min (3.159 mL/Hr) IV Continuous <Continuous>  pantoprazole  Injectable 40 milliGRAM(s) IV Push daily  vasopressin Infusion 0.03 Unit(s)/Min (1.8 mL/Hr) IV Continuous <Continuous>      PHYSICAL EXAM:  T(C): 36.5 (09-16-18 @ 07:00), Max: 37.4 (09-15-18 @ 23:00)  HR: 94 (09-16-18 @ 10:30) (92 - 111)  BP: 123/90 (09-16-18 @ 10:15) (108/90 - 123/90)  RR: 26 (09-16-18 @ 10:30) (19 - 26)  SpO2: 100% (09-16-18 @ 10:30) (97% - 100%)  Wt(kg): --  I&O's Summary    15 Sep 2018 07:01  -  16 Sep 2018 07:00  --------------------------------------------------------  IN: 3109.6 mL / OUT: 1270 mL / NET: 1839.6 mL    16 Sep 2018 07:01  -  16 Sep 2018 11:35  --------------------------------------------------------  IN: 204.3 mL / OUT: 120 mL / NET: 84.3 mL          Appearance: Intubated	  HEENT:   dry  oral mucosa  Lymphatic: No lymphadenopathy  Cardiovascular: tachycardia  S1 S2, No JVD, No murmurs, No edema  Respiratory: ventilated 	  Psychiatry: A & O x 0  Gastrointestinal:  Soft, Non-tender, + BS	  Skin: No rashes, No ecchymoses, No cyanosis	  Neurologic: unresponsive   Extremities: decreased  range of motion, No clubbing, cyanosis or edema  Vascular: Peripheral pulses palpable 2+ bilaterally        LABS:    CARDIAC MARKERS:                                8.2    15.2  )-----------( 168      ( 15 Sep 2018 22:58 )             25.2     09-15    146<H>  |  112<H>  |  20  ----------------------------<  114<H>  4.0   |  24  |  0.65    Ca    8.4      15 Sep 2018 22:58  Phos  2.9     09-15  Mg     2.0     09-15      proBNP:   Lipid Profile:   HgA1c:   TSH:             TELEMETRY: 	  SR/ST   ECG:  	  RADIOLOGY:   DIAGNOSTIC TESTING:  [ ] Echocardiogram:  [ ]  Catheterization:  [ ] Stress Test:    OTHER:

## 2018-09-16 NOTE — PROGRESS NOTE ADULT - ASSESSMENT
Devastating subarachnoid hemorrhage with global cerebral edema  - No neurosurgical intervention per Neurosurgery  - Stabilize vitals and labs in attempt to perform apnea testing, though likely too unstable to do so  - Nuclear medicine scan to assess cerebral perfusion  - Goals of care discussion with family  - Continue supportive drips for now given family request    35 minutes critical care minutes

## 2018-09-16 NOTE — EEG REPORT - NS EEG TEXT BOX
EEG with attenuated cerebral activity ( below 10 uV) with exception of intermittent low amplitude GPDs with a frequency of 0.5 Hz to 10uV, more prominent over the left hemisphere.  No PDR or sleep transients.    At the end of the recording monomorphic rhythmic low amplitude theta activity more prominent over the left hemisphere, which then becomes more periodic bursts of theta near 5hz q2 sec more c/w possible artifact (unclear source).      Impression  Severe cerebral dysfunction evident.  Unable to exclude presence of cerebral activity, consider serial EEG studies if clinically warranted. EEG with attenuated cerebral activity ( below 10 uV) with exception of intermittent low amplitude GPDs with a frequency of 0.5 Hz to 10uV, more prominent over the left hemisphere.  No PDR or sleep transients.    At the end of the recording monomorphic rhythmic low amplitude theta activity more prominent over the left hemisphere, which then becomes more periodic bursts of theta near 5hz q2 sec more c/w possible artifact (unclear source).      Impression  Severe cerebral dysfunction evident.  Artifacts from environment of ICU.  Unable to exclude presence of cerebral activity, consider serial EEG studies if clinically warranted.

## 2018-09-17 DIAGNOSIS — Z71.89 OTHER SPECIFIED COUNSELING: ICD-10-CM

## 2018-09-17 LAB
ALBUMIN SERPL ELPH-MCNC: 2.8 G/DL — LOW (ref 3.3–5)
ALP SERPL-CCNC: 95 U/L — SIGNIFICANT CHANGE UP (ref 40–120)
ALT FLD-CCNC: 29 U/L — SIGNIFICANT CHANGE UP (ref 10–45)
AMYLASE P1 CFR SERPL: 26 U/L — SIGNIFICANT CHANGE UP (ref 25–125)
ANION GAP SERPL CALC-SCNC: 10 MMOL/L — SIGNIFICANT CHANGE UP (ref 5–17)
APPEARANCE UR: CLEAR — SIGNIFICANT CHANGE UP
APTT BLD: 27.6 SEC — SIGNIFICANT CHANGE UP (ref 27.5–37.4)
AST SERPL-CCNC: 39 U/L — SIGNIFICANT CHANGE UP (ref 10–40)
BILIRUB SERPL-MCNC: 0.4 MG/DL — SIGNIFICANT CHANGE UP (ref 0.2–1.2)
BILIRUB UR-MCNC: NEGATIVE — SIGNIFICANT CHANGE UP
BLD GP AB SCN SERPL QL: NEGATIVE — SIGNIFICANT CHANGE UP
BUN SERPL-MCNC: 22 MG/DL — SIGNIFICANT CHANGE UP (ref 7–23)
CALCIUM SERPL-MCNC: 8.8 MG/DL — SIGNIFICANT CHANGE UP (ref 8.4–10.5)
CHLORIDE SERPL-SCNC: 114 MMOL/L — HIGH (ref 96–108)
CK MB BLD-MCNC: 0.9 % — SIGNIFICANT CHANGE UP (ref 0–3.5)
CK MB CFR SERPL CALC: 2.4 NG/ML — SIGNIFICANT CHANGE UP (ref 0–3.8)
CK SERPL-CCNC: 253 U/L — HIGH (ref 25–170)
CO2 SERPL-SCNC: 23 MMOL/L — SIGNIFICANT CHANGE UP (ref 22–31)
COLOR SPEC: YELLOW — SIGNIFICANT CHANGE UP
CREAT SERPL-MCNC: 0.6 MG/DL — SIGNIFICANT CHANGE UP (ref 0.5–1.3)
CULTURE RESULTS: SIGNIFICANT CHANGE UP
DIFF PNL FLD: NEGATIVE — SIGNIFICANT CHANGE UP
FIBRINOGEN PPP-MCNC: 1110 MG/DL — HIGH (ref 310–510)
GAS PNL BLDA: SIGNIFICANT CHANGE UP
GAS PNL BLDA: SIGNIFICANT CHANGE UP
GLUCOSE SERPL-MCNC: 138 MG/DL — HIGH (ref 70–99)
GLUCOSE UR QL: NEGATIVE — SIGNIFICANT CHANGE UP
HCG SERPL-ACNC: <2 MIU/ML — SIGNIFICANT CHANGE UP
INR BLD: 1.26 RATIO — HIGH (ref 0.88–1.16)
KETONES UR-MCNC: NEGATIVE — SIGNIFICANT CHANGE UP
LDH SERPL L TO P-CCNC: 671 U/L — HIGH (ref 50–242)
LEUKOCYTE ESTERASE UR-ACNC: NEGATIVE — SIGNIFICANT CHANGE UP
LIDOCAIN IGE QN: 10 U/L — SIGNIFICANT CHANGE UP (ref 7–60)
MAGNESIUM SERPL-MCNC: 2.2 MG/DL — SIGNIFICANT CHANGE UP (ref 1.6–2.6)
NITRITE UR-MCNC: NEGATIVE — SIGNIFICANT CHANGE UP
OSMOLALITY SERPL: 313 MOS/KG — HIGH (ref 275–300)
PH UR: 7.5 — SIGNIFICANT CHANGE UP (ref 5–8)
PHOSPHATE SERPL-MCNC: 2.8 MG/DL — SIGNIFICANT CHANGE UP (ref 2.5–4.5)
POTASSIUM SERPL-MCNC: 4.1 MMOL/L — SIGNIFICANT CHANGE UP (ref 3.5–5.3)
POTASSIUM SERPL-SCNC: 4.1 MMOL/L — SIGNIFICANT CHANGE UP (ref 3.5–5.3)
PROT SERPL-MCNC: 6.1 G/DL — SIGNIFICANT CHANGE UP (ref 6–8.3)
PROT UR-MCNC: ABNORMAL
PROTHROM AB SERPL-ACNC: 13.7 SEC — HIGH (ref 9.8–12.7)
RH IG SCN BLD-IMP: POSITIVE — SIGNIFICANT CHANGE UP
SODIUM SERPL-SCNC: 147 MMOL/L — HIGH (ref 135–145)
SP GR SPEC: 1.03 — SIGNIFICANT CHANGE UP
SPECIMEN SOURCE: SIGNIFICANT CHANGE UP
TROPONIN T, HIGH SENSITIVITY RESULT: 90 NG/L — HIGH (ref 0–51)
UROBILINOGEN FLD QL: ABNORMAL

## 2018-09-17 PROCEDURE — 99233 SBSQ HOSP IP/OBS HIGH 50: CPT

## 2018-09-17 PROCEDURE — 99291 CRITICAL CARE FIRST HOUR: CPT

## 2018-09-17 PROCEDURE — 71045 X-RAY EXAM CHEST 1 VIEW: CPT | Mod: 26

## 2018-09-17 PROCEDURE — 78607: CPT | Mod: 26

## 2018-09-17 RX ORDER — ALTEPLASE 100 MG
2 KIT INTRAVENOUS ONCE
Refills: 0 | Status: COMPLETED | OUTPATIENT
Start: 2018-09-17 | End: 2018-09-18

## 2018-09-17 RX ORDER — IPRATROPIUM/ALBUTEROL SULFATE 18-103MCG
3 AEROSOL WITH ADAPTER (GRAM) INHALATION EVERY 6 HOURS
Refills: 0 | Status: DISCONTINUED | OUTPATIENT
Start: 2018-09-17 | End: 2018-09-17

## 2018-09-17 RX ORDER — PHENYLEPHRINE HYDROCHLORIDE 10 MG/ML
0.4 INJECTION INTRAVENOUS
Qty: 40 | Refills: 0 | Status: DISCONTINUED | OUTPATIENT
Start: 2018-09-17 | End: 2018-09-18

## 2018-09-17 RX ORDER — PIPERACILLIN AND TAZOBACTAM 4; .5 G/20ML; G/20ML
3.38 INJECTION, POWDER, LYOPHILIZED, FOR SOLUTION INTRAVENOUS EVERY 6 HOURS
Refills: 0 | Status: DISCONTINUED | OUTPATIENT
Start: 2018-09-17 | End: 2018-09-19

## 2018-09-17 RX ORDER — IPRATROPIUM/ALBUTEROL SULFATE 18-103MCG
3 AEROSOL WITH ADAPTER (GRAM) INHALATION ONCE
Refills: 0 | Status: COMPLETED | OUTPATIENT
Start: 2018-09-17 | End: 2018-09-17

## 2018-09-17 RX ORDER — PIPERACILLIN AND TAZOBACTAM 4; .5 G/20ML; G/20ML
3.38 INJECTION, POWDER, LYOPHILIZED, FOR SOLUTION INTRAVENOUS EVERY 4 HOURS
Refills: 0 | Status: DISCONTINUED | OUTPATIENT
Start: 2018-09-17 | End: 2018-09-17

## 2018-09-17 RX ORDER — POTASSIUM CHLORIDE 20 MEQ
20 PACKET (EA) ORAL
Refills: 0 | Status: COMPLETED | OUTPATIENT
Start: 2018-09-17 | End: 2018-09-17

## 2018-09-17 RX ADMIN — Medication 20 MILLIEQUIVALENT(S): at 05:37

## 2018-09-17 RX ADMIN — Medication 3.16 MICROGRAM(S)/KG/MIN: at 02:47

## 2018-09-17 RX ADMIN — Medication 50 MICROGRAM(S)/HR: at 18:47

## 2018-09-17 RX ADMIN — Medication 3 MILLILITER(S): at 12:16

## 2018-09-17 RX ADMIN — Medication 3.16 MICROGRAM(S)/KG/MIN: at 18:46

## 2018-09-17 RX ADMIN — CHLORHEXIDINE GLUCONATE 15 MILLILITER(S): 213 SOLUTION TOPICAL at 05:37

## 2018-09-17 RX ADMIN — Medication 20 MILLIEQUIVALENT(S): at 04:28

## 2018-09-17 RX ADMIN — Medication 1 DROP(S): at 05:38

## 2018-09-17 RX ADMIN — LEVETIRACETAM 1000 MILLIGRAM(S): 250 TABLET, FILM COATED ORAL at 05:39

## 2018-09-17 RX ADMIN — Medication 3 MILLILITER(S): at 08:30

## 2018-09-17 RX ADMIN — VASOPRESSIN 3.6 UNIT(S)/MIN: 20 INJECTION INTRAVENOUS at 02:47

## 2018-09-17 RX ADMIN — VASOPRESSIN 3.6 UNIT(S)/MIN: 20 INJECTION INTRAVENOUS at 18:45

## 2018-09-17 RX ADMIN — Medication 50 MILLIGRAM(S): at 15:30

## 2018-09-17 RX ADMIN — PANTOPRAZOLE SODIUM 40 MILLIGRAM(S): 20 TABLET, DELAYED RELEASE ORAL at 12:57

## 2018-09-17 RX ADMIN — PIPERACILLIN AND TAZOBACTAM 25 GRAM(S): 4; .5 INJECTION, POWDER, LYOPHILIZED, FOR SOLUTION INTRAVENOUS at 23:44

## 2018-09-17 RX ADMIN — Medication 50 MICROGRAM(S)/HR: at 02:47

## 2018-09-17 RX ADMIN — Medication 100 MILLIGRAM(S): at 23:45

## 2018-09-17 RX ADMIN — Medication 50 MILLIGRAM(S): at 05:37

## 2018-09-17 RX ADMIN — Medication 20 MILLIEQUIVALENT(S): at 02:47

## 2018-09-17 NOTE — PROGRESS NOTE ADULT - PROBLEM SELECTOR PROBLEM 3
ARDS (adult respiratory distress syndrome)
Acute respiratory failure with hypoxia
Encephalopathy
ARDS (adult respiratory distress syndrome)
Encephalopathy

## 2018-09-17 NOTE — PROGRESS NOTE ADULT - SUBJECTIVE AND OBJECTIVE BOX
O: remains on 3 pressers, not breathing above the vent    VITALS:  T(C): , Max: 36.7 (09-16-18 @ 23:00)  HR:  (83 - 102)  BP:  (98/52 - 125/92)  ABP:  (98/58 - 129/91)  RR:  (26 - 26)  SpO2:  (95% - 100%)  Wt(kg): --  Device: Avea, Mode: AC/ CMV (Assist Control/ Continuous Mandatory Ventilation), RR (machine): 26, TV (machine): 350, FiO2: 40, PEEP: 5, ITime: 0.8, MAP: 9, PIP: 21    09-16-18 @ 07:01  -  09-17-18 @ 07:00  --------------------------------------------------------  IN: 3653.7 mL / OUT: 2990 mL / NET: 663.7 mL      LABS:  Na: 150 (09-16 @ 21:41), 146 (09-15 @ 22:58), 151 (09-14 @ 23:12)  K: 3.5 (09-16 @ 21:41), 4.0 (09-15 @ 22:58), 3.9 (09-14 @ 23:12)  Cl: 116 (09-16 @ 21:41), 112 (09-15 @ 22:58), 119 (09-14 @ 23:12)  CO2: 23 (09-16 @ 21:41), 24 (09-15 @ 22:58), 23 (09-14 @ 23:12)  BUN: 19 (09-16 @ 21:41), 20 (09-15 @ 22:58), 18 (09-14 @ 23:12)  Cr: 0.55 (09-16 @ 21:41), 0.65 (09-15 @ 22:58), 0.67 (09-14 @ 23:12)  Glu: 109(09-16 @ 21:41), 114(09-15 @ 22:58), 127(09-14 @ 23:12)    Hgb: 8.6 (09-16 @ 21:41), 8.2 (09-15 @ 22:58), 9.0 (09-14 @ 23:12)  Hct: 27.2 (09-16 @ 21:41), 25.2 (09-15 @ 22:58), 26.6 (09-14 @ 23:12)  WBC: 15.4 (09-16 @ 21:41), 15.2 (09-15 @ 22:58), 17.9 (09-14 @ 23:12)  Plt: 173 (09-16 @ 21:41), 168 (09-15 @ 22:58), 168 (09-14 @ 23:12)    acetaminophen    Suspension .. 650 milliGRAM(s) Oral every 6 hours PRN  artificial tears (preservative free) Ophthalmic Solution 1 Drop(s) Both EYES every 8 hours  chlorhexidine 0.12% Liquid 15 milliLiter(s) Swish and Spit two times a day  enoxaparin Injectable 40 milliGRAM(s) SubCutaneous <User Schedule>  hydrocortisone sodium succinate Injectable 50 milliGRAM(s) IV Push every 8 hours  influenza   Vaccine 0.5 milliLiter(s) IntraMuscular once  levETIRAcetam 1000 milliGRAM(s) Oral two times a day  levothyroxine Infusion 20 MICROgram(s)/Hr IV Continuous <Continuous>  norepinephrine Infusion 0.05 MICROgram(s)/kG/Min IV Continuous <Continuous>  pantoprazole  Injectable 40 milliGRAM(s) IV Push daily  vasopressin Infusion 0.06 Unit(s)/Min IV Continuous <Continuous>    EXAMINATION:  HEENT:  3 mm pupils fixed, no dolls  Neuro:  no corneal, no gag and cough, no response to noxious stimuli   Cards:  tachycardic  Respiratory:  intubated, good breath sounds bilaterally   Abdomen:  soft  Extremities:  no edema  Skin:  warm/dry O: DI overnight on vasopressin    T(C): 37 (09-17-18 @ 10:00), Max: 37 (09-17-18 @ 10:00)  HR: 90 (09-17-18 @ 12:16) (78 - 102)  BP: 100/53 (09-16-18 @ 17:45) (98/52 - 123/92)  RR: 26 (09-17-18 @ 12:15) (13 - 26)  SpO2: 92% (09-17-18 @ 12:16) (91% - 100%)  09-16-18 @ 07:01  -  09-17-18 @ 07:00  --------------------------------------------------------  IN: 4335.9 mL / OUT: 3215 mL / NET: 1120.9 mL    09-17-18 @ 07:01  -  09-17-18 @ 12:30  --------------------------------------------------------  IN: 534.4 mL / OUT: 330 mL / NET: 204.4 mL    acetaminophen    Suspension .. 650 milliGRAM(s) Oral every 6 hours PRN  ALBUTerol/ipratropium for Nebulization 3 milliLiter(s) Nebulizer every 6 hours  artificial tears (preservative free) Ophthalmic Solution 1 Drop(s) Both EYES every 8 hours  chlorhexidine 0.12% Liquid 15 milliLiter(s) Swish and Spit two times a day  enoxaparin Injectable 40 milliGRAM(s) SubCutaneous <User Schedule>  hydrocortisone sodium succinate Injectable 50 milliGRAM(s) IV Push every 8 hours  influenza   Vaccine 0.5 milliLiter(s) IntraMuscular once  levETIRAcetam 1000 milliGRAM(s) Oral two times a day  levothyroxine Infusion 20 MICROgram(s)/Hr IV Continuous <Continuous>  norepinephrine Infusion 0.05 MICROgram(s)/kG/Min IV Continuous <Continuous>  pantoprazole  Injectable 40 milliGRAM(s) IV Push daily  phenylephrine    Infusion 0.4 MICROgram(s)/kG/Min IV Continuous <Continuous>  vasopressin Infusion 0.06 Unit(s)/Min IV Continuous <Continuous>    Mode: AC/ CMV (Assist Control/ Continuous Mandatory Ventilation), RR (machine): 26, TV (machine): 350, FiO2: 40, PEEP: 10, ITime: 0.8, MAP: 13, PIP: 27  EXAMINATION:  HEENT:  3 mm pupils fixed, no dolls  Neuro:  no corneal, no gag and cough, no response to noxious stimuli   Cards:  tachycardic  Respiratory:  intubated, good breath sounds bilaterally   Abdomen:  soft  Extremities:  no edema  Skin:  warm/dry    examination done: no pupillary reflex, no corneal, no gag, no cold caloric, no dolls, no movement of 4 extremities to painful stimuli

## 2018-09-17 NOTE — GOALS OF CARE CONVERSATION - PERSONAL ADVANCE DIRECTIVE - CONVERSATION/DISCUSSION
Diagnosis/Prognosis/Chaplaincy Referral/Child Life Referral ([facility],[place])/Palliative Care Referral

## 2018-09-17 NOTE — PROGRESS NOTE ADULT - PROBLEM SELECTOR PLAN 2
likely neurogenic  TTE reviewed   Check LE Venous US negative for DVT
likely neurogenic  Awaiting official TTE  Check LE Venous US rule out DVT
likely neurogenic  TTE reviewed   Check LE Venous US negative for DVT
remains on pressor support
Intubated
likely neurogenic
likely neurogenic  TTE reviewed   Check LE Venous US negative for DVT
On ventilatory support

## 2018-09-17 NOTE — PROGRESS NOTE ADULT - ASSESSMENT
ASSESSMENT/PLAN:  21 yo F with unremarkable PMH, presented with SAH HH5, mF4. Unknown source.  Cerebral edema, possible anoxic cerebral injury.  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  Neurogenic cardiomyopathy with depressed LV function 11%.  S/p cardiac arrest - PEA with ROSC 2 min.  Neurogenic pulmonary edema vs aspiration PNA. Severe ARDS.  coma checks q4hr  EEG is inconclusive, will need ancillary testing once more stable   palliative consult   off sedation, keep Fentanyl off    hypotension, cardiogenic shock: on 3 pressors, we are weaning NE, NS bolus 500 ml,   Vent - ARDS improved, decrease FiO2 to 50%,  decrease PEEP to 5, ABG tonight, CXR improved   GI on TF, free water flushes 300 ml q 6 hrs   cardiogenic shock, On Jaya, VAso  + Milrinone due to hemodynamic instability  hydrocortisone 50 mg q 8 hrs given hypotension resistant to pressors    synthroid 200 mcg in 500 ml saline, 20 mcg /hr, 50 ml/hr   ID: ? aspiration, on unasyn, however no signs of infection, no sputum, no infiltrates, if fever, will pan culture   She has sustained catastrophic bran injury. and might progress to brain death. we can not do apnea test as she is hemodynamically unstable, however exam is consistent with brain death, will get EEG brain death protocol   live on informed   VTE prophylaxis: [x] SCDs [x] hold chemoprophylaxis lovenox 40 mg sc     CODE STATUS:  [x] Full Code [] DNR [] DNI [] Palliative/Comfort Care    DISPOSITION:  [x] ICU [] Stroke Unit [] Floor [] EMU [] RCU [] PCU    [x] Patient is at high risk of neurologic deterioration/death due to: cerebral herniation. SAH      Time spent: 45 critical care minutes    Contact: [phone]

## 2018-09-17 NOTE — GOALS OF CARE CONVERSATION - PERSONAL ADVANCE DIRECTIVE - TREATMENT GUIDELINE COMMENT
Per brain death criteria.  Please defer to [facility] Brain Death policy for further details

## 2018-09-17 NOTE — GOALS OF CARE CONVERSATION - PERSONAL ADVANCE DIRECTIVE - CONVERSATION DETAILS
Family meeting held with parents mother and father, Julisa Pringle, Palliative NP, [facility] neuro fellow, primary SW Claudia, Dr. Gretchen Perez, Palliative Attending, neuro RN and LCSW to provide education on brain death.  Support provided to parents who are grieving. Mother left the meeting crying and Dad indicated he had been in touch with his  last evening and he wanted to pursue an injunction against the hospital to prevent her from being removed from life support.  Father later approached  and asked about "there are people who need a heart and stuff right?" LCSW told Dad there is a team in the hospital that could speak with him further. He said he would speak with Mom. Patient's half brother, age 10 was supervised by Gifty from [employer] during the meeting and he reported limited understanding of patient's status. Palliative will continue to follow to provide ongoing support for family.
I met with John's mother and father, I explained to them that I am very concerned about John's condition, she has sustained catastrophic brain injury, she might progress to become brain dead, will need ancillary testing given hemodynamic instability, can not do Apnea test. Will get EEG brain death protocol. Family seems hopeful for her recovery. we will continue to discuss every day, as she might progress to brain death. SHe has no brainstem reflex, comatose with CT scan showing devastating brain injury.     Alexandra Gold [facility] attending
Met with Tasha and Howard, patients mother and father (they are not )Tasha's 10 y/o son was left with Gifty from [employer].  Dr. Montalvo ([facility] fellow) , Katina Mustafa ([place]  Shantel () Dr. Magana (palliative attending) kasi for meeting.  Meeting was established to discuss the results of the Nuclear Brain Scan, done today, to determine brain death.   Final results indicated no cerebral circulation c/w brain death.  Apnea test unable to be done due to patient instability.  Neuro exam was also consistent with brain death (see  neuro notes for details).   Upon delivery of this update, Tasha, left the room. Howard remained in the room to further discuss other options including a second opinion and petition to  for injunction to stop removal of ventilatory support.  Patients father is aware that time of death has been established and the next steps would be to remove mechanical ventilation if organ donation is declined.   Family grieving, Tasha continues to demonstrate extreme denial over her daughters death stating she is hopeful she will wake up.  Nurse Manager, Dr. Alexandra FLANNERY. aware of results of meeting and they will escalate as they deem appropriate.  We will continue to follow providing support for this devastated family.

## 2018-09-17 NOTE — PROGRESS NOTE ADULT - SUBJECTIVE AND OBJECTIVE BOX
SAH with devastating brain injury, prerequisites satisfied: euthermia temp>36 degrees marcos, normotension SBP> 100 mmhg, no sedatives or confounding drugs, no severe metabolic disturbances, neuro exam consistent with irreversible coma, loss of brainstem reflexes( corneal, gag, cough, pupil, oculocephalic, and oculovestibular ( cold caloric test)), could not do apnea given NE and vasopressin, nuclear imaging done and officially reported at 11: 36 am consistent with cerebral circulatory arrest.  Time of death 11:36 am  live on informed  Family informed with the help of palliative  No Yazdanism objections against brain death, however family refusing the death of their loved one, will give them some time, will involve legal and ethics, will support family emotionally.  Will continue to talk to family     Alexandra Gold [facility] attending

## 2018-09-17 NOTE — PROGRESS NOTE ADULT - SUBJECTIVE AND OBJECTIVE BOX
Subjective: Patient seen and examined. No new events except as noted.   remains in NSCU   no clinical change   EEG was inconclusive for brain death       REVIEW OF SYSTEMS:    Unable to obtain     MEDICATIONS:  MEDICATIONS  (STANDING):  ALBUTerol/ipratropium for Nebulization 3 milliLiter(s) Nebulizer every 6 hours  artificial tears (preservative free) Ophthalmic Solution 1 Drop(s) Both EYES every 8 hours  chlorhexidine 0.12% Liquid 15 milliLiter(s) Swish and Spit two times a day  enoxaparin Injectable 40 milliGRAM(s) SubCutaneous <User Schedule>  hydrocortisone sodium succinate Injectable 50 milliGRAM(s) IV Push every 8 hours  influenza   Vaccine 0.5 milliLiter(s) IntraMuscular once  levETIRAcetam 1000 milliGRAM(s) Oral two times a day  levothyroxine Infusion 20 MICROgram(s)/Hr (50 mL/Hr) IV Continuous <Continuous>  norepinephrine Infusion 0.05 MICROgram(s)/kG/Min (3.159 mL/Hr) IV Continuous <Continuous>  pantoprazole  Injectable 40 milliGRAM(s) IV Push daily  phenylephrine    Infusion 0.4 MICROgram(s)/kG/Min (10.11 mL/Hr) IV Continuous <Continuous>  vasopressin Infusion 0.06 Unit(s)/Min (3.6 mL/Hr) IV Continuous <Continuous>      PHYSICAL EXAM:  T(C): 36.5 (09-17-18 @ 08:45), Max: 36.7 (09-16-18 @ 23:00)  HR: 88 (09-17-18 @ 08:45) (78 - 102)  BP: 100/53 (09-16-18 @ 17:45) (98/52 - 124/94)  RR: 26 (09-17-18 @ 08:45) (26 - 26)  SpO2: 95% (09-17-18 @ 08:45) (92% - 100%)  Wt(kg): --  I&O's Summary    16 Sep 2018 07:01  -  17 Sep 2018 07:00  --------------------------------------------------------  IN: 4335.9 mL / OUT: 3215 mL / NET: 1120.9 mL    17 Sep 2018 07:01  -  17 Sep 2018 11:00  --------------------------------------------------------  IN: 107.4 mL / OUT: 60 mL / NET: 47.4 mL          Appearance: Intubated	  HEENT:   dry  oral mucosa  Lymphatic: No lymphadenopathy  Cardiovascular: tachycardia  S1 S2, No JVD, No murmurs, No edema  Respiratory: ventilated 	  Psychiatry: A & O x 0  Gastrointestinal:  Soft, Non-tender, + BS	  Skin: No rashes, No ecchymoses, No cyanosis	  Neurologic: unresponsive   Extremities: decreased  range of motion, No clubbing, cyanosis or edema  Vascular: Peripheral pulses palpable 2+ bilaterally          LABS:    CARDIAC MARKERS:                                8.6    15.4  )-----------( 173      ( 16 Sep 2018 21:41 )             27.2     09-16    150<H>  |  116<H>  |  19  ----------------------------<  109<H>  3.5   |  23  |  0.55    Ca    8.5      16 Sep 2018 21:41  Phos  3.1     09-16  Mg     2.3     09-16      proBNP:   Lipid Profile:   HgA1c:   TSH:       TECH INFORMATION:   Patient Status: Comatose.     This is a Routine EEG.     Placement and Labeling of Electrodes: The EEG was performed utilizing at least 20 channel referential EEG connections (coronal over temporal over parasagittal montage) with inferior temporal electrodes when indicting and using all standard 10-20 electrode placements with EKG, with additional electrodes placed in the inferior temporal region using the modified 10-10 montage electrode placements for elective admissions, or if deemed necessary.  Recording was at  a sampling rate of 256 samples per second per channel. Time synchronized digital video recording was done simultaneously with EEG recording.  A low light infrared camera was used for low light recording..    EEG Report:  Reason for Test: monitorng of cerebral activity.    EEG REPORT:   EEG Report:  · EEG Report      EEG with attenuated cerebral activity ( below 10 uV) with exception of intermittent low amplitude GPDs with a frequency of 0.5 Hz to 10uV, more prominent over the left hemisphere.  No PDR or sleep transients.    At the end of the recording monomorphic rhythmic low amplitude theta activity more prominent over the left hemisphere, which then becomes more periodic bursts of theta near 5hz q2 sec more c/w possible artifact (unclear source).      Impression  Severe cerebral dysfunction evident.  Artifacts from environment of ICU.  Unable to exclude presence of cerebral activity, consider serial EEG studies if clinically warranted.        Electronic Signatures:  Rocky Almazan)  (Signed 16-Sep-2018 13:19)  	Authored: EEG REPORT  	Co-Signer: TECH INFORMATION, EEG REPORT  Mahogany Oliva)  (Signed 16-Sep-2018 11:03)  	Authored: TECH INFORMATION, EEG REPORT      Last Updated: 16-Sep-2018 13:19 by Rocky Almazan)      TELEMETRY: 	SR/ST     ECG:  	  RADIOLOGY:   DIAGNOSTIC TESTING:  [ ] Echocardiogram:  [ ]  Catheterization:  [ ] Stress Test:    OTHER:

## 2018-09-17 NOTE — PROGRESS NOTE ADULT - SUBJECTIVE AND OBJECTIVE BOX
SUBJECTIVE AND OBJECTIVE:  INTERVAL HPI/OVERNIGHT EVENTS:   Met brain death criteria via Nuclear Scan to determine cerebral circulation    DNR on chart:   Allergies    No Known Allergies    Intolerances    MEDICATIONS  (STANDING):  ALBUTerol/ipratropium for Nebulization 3 milliLiter(s) Nebulizer every 6 hours  artificial tears (preservative free) Ophthalmic Solution 1 Drop(s) Both EYES every 8 hours  chlorhexidine 0.12% Liquid 15 milliLiter(s) Swish and Spit two times a day  enoxaparin Injectable 40 milliGRAM(s) SubCutaneous <User Schedule>  hydrocortisone sodium succinate Injectable 50 milliGRAM(s) IV Push every 8 hours  influenza   Vaccine 0.5 milliLiter(s) IntraMuscular once  levETIRAcetam 1000 milliGRAM(s) Oral two times a day  levothyroxine Infusion 20 MICROgram(s)/Hr (50 mL/Hr) IV Continuous <Continuous>  norepinephrine Infusion 0.05 MICROgram(s)/kG/Min (3.159 mL/Hr) IV Continuous <Continuous>  pantoprazole  Injectable 40 milliGRAM(s) IV Push daily  phenylephrine    Infusion 0.4 MICROgram(s)/kG/Min (10.11 mL/Hr) IV Continuous <Continuous>  vasopressin Infusion 0.06 Unit(s)/Min (3.6 mL/Hr) IV Continuous <Continuous>    MEDICATIONS  (PRN):  acetaminophen    Suspension .. 650 milliGRAM(s) Oral every 6 hours PRN Temp greater or equal to 38C (100.4F), Mild Pain (1 - 3)      ITEMS UNCHECKED ARE NOT PRESENT    PRESENT SYMPTOMS: [ x]Unable to obtain due to poor mentation   Source if other than patient:  [ ]Family   [ ]Team     Pain (Impact on QOL):    Location:  Minimal acceptable level (0-10 scale):            Aggravating factors:  Quality:  Radiation:  Severity (0-10 scale):    Timing:    Dyspnea:                           [ ]Mild [ ]Moderate [ ]Severe  Anxiety:                             [ ]Mild [ ]Moderate [ ]Severe  Fatigue:                             [ ]Mild [ ]Moderate [ ]Severe  Nausea:                             [ ]Mild [ ]Moderate [ ]Severe  Loss of appetite:              [ ]Mild [ ]Moderate [ ]Severe  Constipation:                    [ ]Mild [ ]Moderate [ ]Severe    PAIN AD Score:	0  [email or web address] (Ctrl + left click to view)    Other Symptoms:  [ ]All other review of systems negative     Karnofsky Performance Score/Palliative Performance Status Version 2:    0     %    http://palliative.info/resource_material/[email or web address].pdf  PHYSICAL EXAM:  Vital Signs Last 24 Hrs  T(C): 37 (17 Sep 2018 10:00), Max: 37 (17 Sep 2018 10:00)  T(F): 98.6 (17 Sep 2018 10:00), Max: 98.6 (17 Sep 2018 10:00)  HR: 89 (17 Sep 2018 14:45) (78 - 102)  BP: 100/53 (16 Sep 2018 17:45) (98/52 - 121/84)  BP(mean): 68 (16 Sep 2018 17:45) (61 - 95)  RR: 30 (17 Sep 2018 14:45) (13 - 30)  SpO2: 94% (17 Sep 2018 14:45) (91% - 100%) I&O's Summary    16 Sep 2018 07:01  -  17 Sep 2018 07:00  --------------------------------------------------------  IN: 4335.9 mL / OUT: 3215 mL / NET: 1120.9 mL    17 Sep 2018 07:01  -  17 Sep 2018 14:58  --------------------------------------------------------  IN: 755.6 mL / OUT: 405 mL / NET: 350.6 mL     GENERAL:  [ ]Alert  [ ]Oriented x   [ ]Lethargic  [ ]Cachexia  [ x]Unarousable  [ ]Verbal  [ x]Non-Verbal    Behavioral:   [ ] Anxiety  [ ] Delirium [ ] Agitation [ ] Other    HEENT:  [ ]Normal   [ ]Dry mouth   [x ]ET Tube/Trach  [ ]Oral lesions    PULMONARY: Vented  [ ]Clear [ ]Tachypnea  [ ]Audible excessive secretions   [ ]Rhonchi        [ ]Right [ ]Left [ ]Bilateral  [ ]Crackles        [ ]Right [ ]Left [ ]Bilateral  [ ]Wheezing     [ ]Right [ ]Left [ ]Bilateral    CARDIOVASCULAR:    [ ]Regular [x ]Irregular [ ]Tachy  [ ]Kory [ ]Murmur [ ]Other    GASTROINTESTINAL:  [ ]Soft  [ ]Distended   [ ]+BS  [ ]Non tender [ ]Tender  [ ]PEG [ x]OGT/ NGT   Last BM:    GENITOURINARY:  [ ]Normal [ ] Incontinent   [ ]Oliguria/Anuria   [ ]Cole    MUSCULOSKELETAL:   [ ]Normal   [ ]Weakness  [ x]Bed/Wheelchair bound [ ]Edema    NEUROLOGIC: Brain Dead   [ ]No focal deficits  [ ] Cognitive impairment  [ ] Dysphagia [ ]Dysarthria [ ] Paresis [ ]Other     SKIN:   [x ]Normal   [ ]Pressure ulcer(s)  [ ]Rash    CRITICAL CARE:  [ ] Shock Present  [ ]Septic [ ]Cardiogenic [x ]Neurologic [ ]Hypovolemic  [ ]  Vasopressors [ ]  Inotropes   [ ] Respiratory failure present  [ ] Acute  [ ] Chronic [ ] Hypoxic  [ ] Hypercarbic [ ] Other  [ ] Other organ failure     LABS:                        8.6    15.4  )-----------( 173      ( 16 Sep 2018 21:41 )             27.2   09-16    150<H>  |  116<H>  |  19  ----------------------------<  109<H>  3.5   |  23  |  0.55    Ca    8.5      16 Sep 2018 21:41  Phos  3.1     09-16  Mg     2.3     09-16          RADIOLOGY & ADDITIONAL STUDIES:    Protein Calorie Malnutrition Present: [ ] yes [ ] no  [ ] PPSV2 < or = 30%  [ ] significant weight loss [ ] poor nutritional intake [ ] anasarca [ ] catabolic state Albumin, Serum: 2.6 g/dL (09-13-18 @ 22:18)  Artificial Nutrition [ ]     REFERRALS:   [x ]Chaplaincy  [ ] Hospice  [ x][facility]  [x ]Social Work  [ ]Case management [ ]Holistic Therapy   Goals of Care Document: Goals of Care Conversation - Personal Advance Directive [EVER Gold] (09-15-18 @ 18:24)

## 2018-09-17 NOTE — PROGRESS NOTE ADULT - PROBLEM SELECTOR PROBLEM 2
Acute respiratory failure
Cardiomyopathy
Acute respiratory failure
Cardiomyopathy
Cardiomyopathy
Other cardiomyopathy

## 2018-09-18 LAB
ALBUMIN SERPL ELPH-MCNC: 2.6 G/DL — LOW (ref 3.3–5)
ALBUMIN SERPL ELPH-MCNC: 2.6 G/DL — LOW (ref 3.3–5)
ALBUMIN SERPL ELPH-MCNC: 2.7 G/DL — LOW (ref 3.3–5)
ALBUMIN SERPL ELPH-MCNC: 2.8 G/DL — LOW (ref 3.3–5)
ALP SERPL-CCNC: 113 U/L — SIGNIFICANT CHANGE UP (ref 40–120)
ALP SERPL-CCNC: 119 U/L — SIGNIFICANT CHANGE UP (ref 40–120)
ALP SERPL-CCNC: 121 U/L — HIGH (ref 40–120)
ALP SERPL-CCNC: 124 U/L — HIGH (ref 40–120)
ALT FLD-CCNC: 40 U/L — SIGNIFICANT CHANGE UP (ref 10–45)
ALT FLD-CCNC: 40 U/L — SIGNIFICANT CHANGE UP (ref 10–45)
ALT FLD-CCNC: 41 U/L — SIGNIFICANT CHANGE UP (ref 10–45)
ALT FLD-CCNC: 45 U/L — SIGNIFICANT CHANGE UP (ref 10–45)
AMYLASE P1 CFR SERPL: 23 U/L — LOW (ref 25–125)
AMYLASE P1 CFR SERPL: 25 U/L — SIGNIFICANT CHANGE UP (ref 25–125)
AMYLASE P1 CFR SERPL: 25 U/L — SIGNIFICANT CHANGE UP (ref 25–125)
AMYLASE P1 CFR SERPL: 26 U/L — SIGNIFICANT CHANGE UP (ref 25–125)
ANION GAP SERPL CALC-SCNC: 10 MMOL/L — SIGNIFICANT CHANGE UP (ref 5–17)
ANION GAP SERPL CALC-SCNC: 11 MMOL/L — SIGNIFICANT CHANGE UP (ref 5–17)
ANION GAP SERPL CALC-SCNC: 13 MMOL/L — SIGNIFICANT CHANGE UP (ref 5–17)
ANION GAP SERPL CALC-SCNC: 7 MMOL/L — SIGNIFICANT CHANGE UP (ref 5–17)
APPEARANCE UR: CLEAR — SIGNIFICANT CHANGE UP
APTT BLD: 26.8 SEC — LOW (ref 27.5–37.4)
APTT BLD: 27.8 SEC — SIGNIFICANT CHANGE UP (ref 27.5–37.4)
APTT BLD: 27.9 SEC — SIGNIFICANT CHANGE UP (ref 27.5–37.4)
AST SERPL-CCNC: 39 U/L — SIGNIFICANT CHANGE UP (ref 10–40)
AST SERPL-CCNC: 44 U/L — HIGH (ref 10–40)
AST SERPL-CCNC: 56 U/L — HIGH (ref 10–40)
AST SERPL-CCNC: 57 U/L — HIGH (ref 10–40)
BASOPHILS # BLD AUTO: 0 K/UL — SIGNIFICANT CHANGE UP (ref 0–0.2)
BASOPHILS # BLD AUTO: 0 K/UL — SIGNIFICANT CHANGE UP (ref 0–0.2)
BASOPHILS NFR BLD AUTO: 0.2 % — SIGNIFICANT CHANGE UP (ref 0–2)
BILIRUB DIRECT SERPL-MCNC: 0.2 MG/DL — SIGNIFICANT CHANGE UP (ref 0–0.2)
BILIRUB DIRECT SERPL-MCNC: 0.2 MG/DL — SIGNIFICANT CHANGE UP (ref 0–0.2)
BILIRUB SERPL-MCNC: 0.4 MG/DL — SIGNIFICANT CHANGE UP (ref 0.2–1.2)
BILIRUB SERPL-MCNC: 0.5 MG/DL — SIGNIFICANT CHANGE UP (ref 0.2–1.2)
BILIRUB UR-MCNC: NEGATIVE — SIGNIFICANT CHANGE UP
BUN SERPL-MCNC: 21 MG/DL — SIGNIFICANT CHANGE UP (ref 7–23)
BUN SERPL-MCNC: 21 MG/DL — SIGNIFICANT CHANGE UP (ref 7–23)
BUN SERPL-MCNC: 22 MG/DL — SIGNIFICANT CHANGE UP (ref 7–23)
BUN SERPL-MCNC: 23 MG/DL — SIGNIFICANT CHANGE UP (ref 7–23)
CALCIUM SERPL-MCNC: 8.6 MG/DL — SIGNIFICANT CHANGE UP (ref 8.4–10.5)
CALCIUM SERPL-MCNC: 8.7 MG/DL — SIGNIFICANT CHANGE UP (ref 8.4–10.5)
CALCIUM SERPL-MCNC: 8.9 MG/DL — SIGNIFICANT CHANGE UP (ref 8.4–10.5)
CALCIUM SERPL-MCNC: 9.2 MG/DL — SIGNIFICANT CHANGE UP (ref 8.4–10.5)
CHLORIDE SERPL-SCNC: 108 MMOL/L — SIGNIFICANT CHANGE UP (ref 96–108)
CHLORIDE SERPL-SCNC: 108 MMOL/L — SIGNIFICANT CHANGE UP (ref 96–108)
CHLORIDE SERPL-SCNC: 110 MMOL/L — HIGH (ref 96–108)
CHLORIDE SERPL-SCNC: 112 MMOL/L — HIGH (ref 96–108)
CK MB BLD-MCNC: 1 % — SIGNIFICANT CHANGE UP (ref 0–3.5)
CK MB BLD-MCNC: 1.2 % — SIGNIFICANT CHANGE UP (ref 0–3.5)
CK MB BLD-MCNC: 1.5 % — SIGNIFICANT CHANGE UP (ref 0–3.5)
CK MB BLD-MCNC: 1.8 % — SIGNIFICANT CHANGE UP (ref 0–3.5)
CK MB CFR SERPL CALC: 2.3 NG/ML — SIGNIFICANT CHANGE UP (ref 0–3.8)
CK MB CFR SERPL CALC: 2.6 NG/ML — SIGNIFICANT CHANGE UP (ref 0–3.8)
CK MB CFR SERPL CALC: 2.9 NG/ML — SIGNIFICANT CHANGE UP (ref 0–3.8)
CK MB CFR SERPL CALC: 2.9 NG/ML — SIGNIFICANT CHANGE UP (ref 0–3.8)
CK SERPL-CCNC: 158 U/L — SIGNIFICANT CHANGE UP (ref 25–170)
CK SERPL-CCNC: 189 U/L — HIGH (ref 25–170)
CK SERPL-CCNC: 218 U/L — HIGH (ref 25–170)
CK SERPL-CCNC: 232 U/L — HIGH (ref 25–170)
CO2 SERPL-SCNC: 22 MMOL/L — SIGNIFICANT CHANGE UP (ref 22–31)
CO2 SERPL-SCNC: 24 MMOL/L — SIGNIFICANT CHANGE UP (ref 22–31)
CO2 SERPL-SCNC: 25 MMOL/L — SIGNIFICANT CHANGE UP (ref 22–31)
CO2 SERPL-SCNC: 26 MMOL/L — SIGNIFICANT CHANGE UP (ref 22–31)
COLOR SPEC: YELLOW — SIGNIFICANT CHANGE UP
CREAT SERPL-MCNC: 0.45 MG/DL — LOW (ref 0.5–1.3)
CREAT SERPL-MCNC: 0.54 MG/DL — SIGNIFICANT CHANGE UP (ref 0.5–1.3)
CREAT SERPL-MCNC: 0.54 MG/DL — SIGNIFICANT CHANGE UP (ref 0.5–1.3)
CREAT SERPL-MCNC: 0.6 MG/DL — SIGNIFICANT CHANGE UP (ref 0.5–1.3)
CULTURE RESULTS: NO GROWTH — SIGNIFICANT CHANGE UP
DIFF PNL FLD: ABNORMAL
EOSINOPHIL # BLD AUTO: 0 K/UL — SIGNIFICANT CHANGE UP (ref 0–0.5)
EOSINOPHIL # BLD AUTO: 0 K/UL — SIGNIFICANT CHANGE UP (ref 0–0.5)
EOSINOPHIL NFR BLD AUTO: 0.1 % — SIGNIFICANT CHANGE UP (ref 0–6)
FIBRINOGEN PPP-MCNC: 1184 MG/DL — HIGH (ref 310–510)
FIBRINOGEN PPP-MCNC: 1226 MG/DL — SIGNIFICANT CHANGE UP (ref 310–510)
FIBRINOGEN PPP-MCNC: 1240 MG/DL — HIGH (ref 310–510)
GAS PNL BLDA: SIGNIFICANT CHANGE UP
GGT SERPL-CCNC: 65 U/L — HIGH (ref 8–40)
GGT SERPL-CCNC: 90 U/L — HIGH (ref 8–40)
GGT SERPL-CCNC: 92 U/L — HIGH (ref 8–40)
GLUCOSE SERPL-MCNC: 134 MG/DL — HIGH (ref 70–99)
GLUCOSE SERPL-MCNC: 163 MG/DL — HIGH (ref 70–99)
GLUCOSE SERPL-MCNC: 168 MG/DL — HIGH (ref 70–99)
GLUCOSE SERPL-MCNC: 182 MG/DL — HIGH (ref 70–99)
GLUCOSE UR QL: NEGATIVE — SIGNIFICANT CHANGE UP
GRAM STN FLD: SIGNIFICANT CHANGE UP
HCT VFR BLD CALC: 22.9 % — LOW (ref 34.5–45)
HCT VFR BLD CALC: 23.2 % — LOW (ref 34.5–45)
HCT VFR BLD CALC: 24.7 % — LOW (ref 34.5–45)
HCT VFR BLD CALC: 25.7 % — LOW (ref 34.5–45)
HGB BLD-MCNC: 7.3 G/DL — LOW (ref 11.5–15.5)
HGB BLD-MCNC: 7.7 G/DL — LOW (ref 11.5–15.5)
HGB BLD-MCNC: 8 G/DL — LOW (ref 11.5–15.5)
HGB BLD-MCNC: 8.3 G/DL — LOW (ref 11.5–15.5)
INR BLD: 1.31 RATIO — HIGH (ref 0.88–1.16)
INR BLD: 1.35 RATIO — HIGH (ref 0.88–1.16)
INR BLD: 1.36 RATIO — HIGH (ref 0.88–1.16)
KETONES UR-MCNC: NEGATIVE — SIGNIFICANT CHANGE UP
LDH SERPL L TO P-CCNC: 717 U/L — HIGH (ref 50–242)
LDH SERPL L TO P-CCNC: 720 U/L — HIGH (ref 50–242)
LDH SERPL L TO P-CCNC: 733 U/L — HIGH (ref 50–242)
LDH SERPL L TO P-CCNC: 743 U/L — HIGH (ref 50–242)
LEUKOCYTE ESTERASE UR-ACNC: NEGATIVE — SIGNIFICANT CHANGE UP
LIDOCAIN IGE QN: 11 U/L — SIGNIFICANT CHANGE UP (ref 7–60)
LIDOCAIN IGE QN: 11 U/L — SIGNIFICANT CHANGE UP (ref 7–60)
LIDOCAIN IGE QN: 8 U/L — SIGNIFICANT CHANGE UP (ref 7–60)
LIDOCAIN IGE QN: 8 U/L — SIGNIFICANT CHANGE UP (ref 7–60)
LYMPHOCYTES # BLD AUTO: 0.5 K/UL — LOW (ref 1–3.3)
LYMPHOCYTES # BLD AUTO: 0.8 K/UL — LOW (ref 1–3.3)
LYMPHOCYTES # BLD AUTO: 5.9 % — LOW (ref 13–44)
LYMPHOCYTES # BLD AUTO: 9 % — LOW (ref 13–44)
MAGNESIUM SERPL-MCNC: 2.2 MG/DL — SIGNIFICANT CHANGE UP (ref 1.6–2.6)
MCHC RBC-ENTMCNC: 29.4 PG — SIGNIFICANT CHANGE UP (ref 27–34)
MCHC RBC-ENTMCNC: 30.3 PG — SIGNIFICANT CHANGE UP (ref 27–34)
MCHC RBC-ENTMCNC: 30.4 PG — SIGNIFICANT CHANGE UP (ref 27–34)
MCHC RBC-ENTMCNC: 30.7 PG — SIGNIFICANT CHANGE UP (ref 27–34)
MCHC RBC-ENTMCNC: 31.7 GM/DL — LOW (ref 32–36)
MCHC RBC-ENTMCNC: 32.4 GM/DL — SIGNIFICANT CHANGE UP (ref 32–36)
MCHC RBC-ENTMCNC: 32.5 GM/DL — SIGNIFICANT CHANGE UP (ref 32–36)
MCHC RBC-ENTMCNC: 33.2 GM/DL — SIGNIFICANT CHANGE UP (ref 32–36)
MCV RBC AUTO: 92.6 FL — SIGNIFICANT CHANGE UP (ref 80–100)
MCV RBC AUTO: 92.6 FL — SIGNIFICANT CHANGE UP (ref 80–100)
MCV RBC AUTO: 93.4 FL — SIGNIFICANT CHANGE UP (ref 80–100)
MCV RBC AUTO: 93.7 FL — SIGNIFICANT CHANGE UP (ref 80–100)
MONOCYTES # BLD AUTO: 0.3 K/UL — SIGNIFICANT CHANGE UP (ref 0–0.9)
MONOCYTES # BLD AUTO: 0.8 K/UL — SIGNIFICANT CHANGE UP (ref 0–0.9)
MONOCYTES NFR BLD AUTO: 5 % — SIGNIFICANT CHANGE UP (ref 2–14)
MONOCYTES NFR BLD AUTO: 6.4 % — SIGNIFICANT CHANGE UP (ref 2–14)
NEUTROPHILS # BLD AUTO: 11.3 K/UL — HIGH (ref 1.8–7.4)
NEUTROPHILS # BLD AUTO: 12.7 K/UL — HIGH (ref 1.8–7.4)
NEUTROPHILS NFR BLD AUTO: 84 % — HIGH (ref 43–77)
NEUTROPHILS NFR BLD AUTO: 87.5 % — HIGH (ref 43–77)
NITRITE UR-MCNC: NEGATIVE — SIGNIFICANT CHANGE UP
OSMOLALITY SERPL: 316 MOS/KG — HIGH (ref 275–300)
PH UR: 6.5 — SIGNIFICANT CHANGE UP (ref 5–8)
PHOSPHATE SERPL-MCNC: 2.8 MG/DL — SIGNIFICANT CHANGE UP (ref 2.5–4.5)
PHOSPHATE SERPL-MCNC: 2.9 MG/DL — SIGNIFICANT CHANGE UP (ref 2.5–4.5)
PHOSPHATE SERPL-MCNC: 3.3 MG/DL — SIGNIFICANT CHANGE UP (ref 2.5–4.5)
PHOSPHATE SERPL-MCNC: 3.4 MG/DL — SIGNIFICANT CHANGE UP (ref 2.5–4.5)
PLATELET # BLD AUTO: 207 K/UL — SIGNIFICANT CHANGE UP (ref 150–400)
PLATELET # BLD AUTO: 211 K/UL — SIGNIFICANT CHANGE UP (ref 150–400)
PLATELET # BLD AUTO: 223 K/UL — SIGNIFICANT CHANGE UP (ref 150–400)
PLATELET # BLD AUTO: 226 K/UL — SIGNIFICANT CHANGE UP (ref 150–400)
POTASSIUM SERPL-MCNC: 3.9 MMOL/L — SIGNIFICANT CHANGE UP (ref 3.5–5.3)
POTASSIUM SERPL-MCNC: 4.1 MMOL/L — SIGNIFICANT CHANGE UP (ref 3.5–5.3)
POTASSIUM SERPL-MCNC: 4.4 MMOL/L — SIGNIFICANT CHANGE UP (ref 3.5–5.3)
POTASSIUM SERPL-MCNC: 4.4 MMOL/L — SIGNIFICANT CHANGE UP (ref 3.5–5.3)
POTASSIUM SERPL-SCNC: 3.9 MMOL/L — SIGNIFICANT CHANGE UP (ref 3.5–5.3)
POTASSIUM SERPL-SCNC: 4.1 MMOL/L — SIGNIFICANT CHANGE UP (ref 3.5–5.3)
POTASSIUM SERPL-SCNC: 4.4 MMOL/L — SIGNIFICANT CHANGE UP (ref 3.5–5.3)
POTASSIUM SERPL-SCNC: 4.4 MMOL/L — SIGNIFICANT CHANGE UP (ref 3.5–5.3)
PROT SERPL-MCNC: 5.8 G/DL — LOW (ref 6–8.3)
PROT SERPL-MCNC: 6.1 G/DL — SIGNIFICANT CHANGE UP (ref 6–8.3)
PROT SERPL-MCNC: 6.2 G/DL — SIGNIFICANT CHANGE UP (ref 6–8.3)
PROT SERPL-MCNC: 6.3 G/DL — SIGNIFICANT CHANGE UP (ref 6–8.3)
PROT UR-MCNC: ABNORMAL
PROTHROM AB SERPL-ACNC: 14.4 SEC — HIGH (ref 9.8–12.7)
PROTHROM AB SERPL-ACNC: 14.7 SEC — HIGH (ref 9.8–12.7)
PROTHROM AB SERPL-ACNC: 14.9 SEC — HIGH (ref 9.8–12.7)
RBC # BLD: 2.48 M/UL — LOW (ref 3.8–5.2)
RBC # BLD: 2.51 M/UL — LOW (ref 3.8–5.2)
RBC # BLD: 2.64 M/UL — LOW (ref 3.8–5.2)
RBC # BLD: 2.74 M/UL — LOW (ref 3.8–5.2)
RBC # FLD: 12.7 % — SIGNIFICANT CHANGE UP (ref 10.3–14.5)
RBC # FLD: 12.7 % — SIGNIFICANT CHANGE UP (ref 10.3–14.5)
RBC # FLD: 13 % — SIGNIFICANT CHANGE UP (ref 10.3–14.5)
RBC # FLD: 13.4 % — SIGNIFICANT CHANGE UP (ref 10.3–14.5)
SODIUM SERPL-SCNC: 142 MMOL/L — SIGNIFICANT CHANGE UP (ref 135–145)
SODIUM SERPL-SCNC: 143 MMOL/L — SIGNIFICANT CHANGE UP (ref 135–145)
SODIUM SERPL-SCNC: 144 MMOL/L — SIGNIFICANT CHANGE UP (ref 135–145)
SODIUM SERPL-SCNC: 147 MMOL/L — HIGH (ref 135–145)
SP GR SPEC: 1.03 — SIGNIFICANT CHANGE UP
SP GR UR STRIP: 1.03 — HIGH (ref 1.01–1.02)
SPECIMEN SOURCE: SIGNIFICANT CHANGE UP
SPECIMEN SOURCE: SIGNIFICANT CHANGE UP
TROPONIN T, HIGH SENSITIVITY RESULT: 46 NG/L — SIGNIFICANT CHANGE UP (ref 0–51)
TROPONIN T, HIGH SENSITIVITY RESULT: 49 NG/L — SIGNIFICANT CHANGE UP (ref 0–51)
TROPONIN T, HIGH SENSITIVITY RESULT: 55 NG/L — HIGH (ref 0–51)
TROPONIN T, HIGH SENSITIVITY RESULT: 75 NG/L — HIGH (ref 0–51)
UROBILINOGEN FLD QL: ABNORMAL
WBC # BLD: 12.9 K/UL — HIGH (ref 3.8–10.5)
WBC # BLD: 13.6 K/UL — HIGH (ref 3.8–10.5)
WBC # BLD: 13.7 K/UL — HIGH (ref 3.8–10.5)
WBC # BLD: 14.6 K/UL — HIGH (ref 3.8–10.5)
WBC # FLD AUTO: 12.9 K/UL — HIGH (ref 3.8–10.5)
WBC # FLD AUTO: 13.6 K/UL — HIGH (ref 3.8–10.5)
WBC # FLD AUTO: 13.7 K/UL — HIGH (ref 3.8–10.5)
WBC # FLD AUTO: 14.6 K/UL — HIGH (ref 3.8–10.5)

## 2018-09-18 PROCEDURE — 93010 ELECTROCARDIOGRAM REPORT: CPT

## 2018-09-18 PROCEDURE — 93308 TTE F-UP OR LMTD: CPT | Mod: 26

## 2018-09-18 PROCEDURE — 71045 X-RAY EXAM CHEST 1 VIEW: CPT | Mod: 26

## 2018-09-18 PROCEDURE — 99291 CRITICAL CARE FIRST HOUR: CPT

## 2018-09-18 PROCEDURE — 93321 DOPPLER ECHO F-UP/LMTD STD: CPT | Mod: 26

## 2018-09-18 PROCEDURE — 76700 US EXAM ABDOM COMPLETE: CPT | Mod: 26

## 2018-09-18 PROCEDURE — 36620 INSERTION CATHETER ARTERY: CPT

## 2018-09-18 RX ORDER — SODIUM CHLORIDE 9 MG/ML
500 INJECTION, SOLUTION INTRAVENOUS
Refills: 0 | Status: DISCONTINUED | OUTPATIENT
Start: 2018-09-18 | End: 2018-09-19

## 2018-09-18 RX ORDER — ALTEPLASE 100 MG
2 KIT INTRAVENOUS ONCE
Refills: 0 | Status: DISCONTINUED | OUTPATIENT
Start: 2018-09-18 | End: 2018-09-19

## 2018-09-18 RX ADMIN — Medication 100 MILLIGRAM(S): at 11:41

## 2018-09-18 RX ADMIN — PIPERACILLIN AND TAZOBACTAM 25 GRAM(S): 4; .5 INJECTION, POWDER, LYOPHILIZED, FOR SOLUTION INTRAVENOUS at 11:41

## 2018-09-18 RX ADMIN — PIPERACILLIN AND TAZOBACTAM 25 GRAM(S): 4; .5 INJECTION, POWDER, LYOPHILIZED, FOR SOLUTION INTRAVENOUS at 19:00

## 2018-09-18 RX ADMIN — Medication 100 MILLIGRAM(S): at 23:21

## 2018-09-18 RX ADMIN — PIPERACILLIN AND TAZOBACTAM 25 GRAM(S): 4; .5 INJECTION, POWDER, LYOPHILIZED, FOR SOLUTION INTRAVENOUS at 23:22

## 2018-09-18 RX ADMIN — ALTEPLASE 2 MILLIGRAM(S): KIT at 03:15

## 2018-09-18 RX ADMIN — PIPERACILLIN AND TAZOBACTAM 25 GRAM(S): 4; .5 INJECTION, POWDER, LYOPHILIZED, FOR SOLUTION INTRAVENOUS at 06:10

## 2018-09-18 NOTE — PROGRESS NOTE ADULT - PROBLEM SELECTOR PLAN 1
orders per NSCU team  prognosis poor
No brain activity   will sign off
Patient in critical condition. Ancillary testing in place to declare whether patient has brain death.
orders per NSCU team  prognosis poor
orders per NSCU team  prognosis poor
orders per NSCU team  For nuclear medicine perfusion test today   prognosis poor
orders per NSCU team  prognosis poor
remains critical in NSCU; per neurosurgery team, attempting to wean pressors in order to initiate brain death protocol.
Patient has been declared brain dead via nuclear scan with noted absent cerebral circulation

## 2018-09-18 NOTE — PROCEDURE NOTE - PROCEDURE
Arterial cannula insertion  09/12/2018    Active  Delmar Purvis <<-----Click on this checkbox to enter Procedure

## 2018-09-18 NOTE — CHART NOTE - NSCHARTNOTEFT_GEN_A_CORE
9/12/18  1145am  Transcranial doppler exam done.  Full report to follow.  Donna
Emotional support provided;  water and ice given to family at bedside with NSCU SW  Palliative will be available to help with meetings and ongoing emotional support. Will continue to follow  Please call if any changes or plans for meetings
Patient progressed to brain death based on nuclear scan , no cerebral circulation noted.   [facility] following patient now for potential organ donation.  Will sign off , thank you

## 2018-09-18 NOTE — PROVIDER CONTACT NOTE (OTHER) - SITUATION
Arterial waveform flattened and no blood return present. Central line no blood return from any ports.

## 2018-09-18 NOTE — PROCEDURE NOTE - NSPROCDETAILS_GEN_ALL_CORE
location identified, draped/prepped, sterile technique used, needle inserted/introduced/positive blood return obtained via catheter/connected to a pressurized flush line/sutured in place/all materials/supplies accounted for at end of procedure
guidewire recovered/lumen(s) aspirated and flushed/sterile dressing applied/sterile technique, catheter placed/ultrasound guidance
location identified, draped/prepped, sterile technique used, needle inserted/introduced/positive blood return obtained via catheter/connected to a pressurized flush line/sutured in place/hemostasis with direct pressure, dressing applied/Seldinger technique/all materials/supplies accounted for at end of procedure

## 2018-09-18 NOTE — PROGRESS NOTE ADULT - PROBLEM SELECTOR PROBLEM 1
SAH (subarachnoid hemorrhage)

## 2018-09-18 NOTE — PROCEDURE NOTE - NSPROCNAME_GEN_A_CORE
Arterial Puncture/Cannulation
Arterial Puncture/Cannulation
Central Line Insertion
Arterial Puncture/Cannulation

## 2018-09-18 NOTE — PROGRESS NOTE ADULT - PROVIDER SPECIALTY LIST ADULT
NSICU
NSICU
Neurosurgery
Palliative Care
Cardiology
NSICU
NSICU
Neurosurgery
Neurosurgery
NSICU
Neurosurgery
Cardiology
Neurosurgery
NSICU
Cardiology
Cardiology
Palliative Care
Cardiology
Cardiology
Palliative Care

## 2018-09-18 NOTE — PROGRESS NOTE ADULT - SUBJECTIVE AND OBJECTIVE BOX
Patient seen     T(C): 36 (09-18-18 @ 15:00), Max: 37.1 (09-17-18 @ 19:00)  HR: 84 (09-18-18 @ 16:38) (81 - 97)  BP: 134/107 (09-18-18 @ 16:15) (117/68 - 158/124)  RR: 30 (09-18-18 @ 16:15) (26 - 30)  SpO2: 100% (09-18-18 @ 16:38) (87% - 100%)  09-17-18 @ 07:01  -  09-18-18 @ 07:00  --------------------------------------------------------  IN: 3441.6 mL / OUT: 1755 mL / NET: 1686.6 mL    09-18-18 @ 07:01  -  09-18-18 @ 17:08  --------------------------------------------------------  IN: 1389.1 mL / OUT: 620 mL / NET: 769.1 mL    alteplase for catheter clearance 2 milliGRAM(s) Catheter once  levothyroxine Infusion 20 MICROgram(s)/Hr IV Continuous <Continuous>  methylPREDNISolone sodium succinate IVPB 500 milliGRAM(s) IV Intermittent every 12 hours  norepinephrine Infusion 0.05 MICROgram(s)/kG/Min IV Continuous <Continuous>  piperacillin/tazobactam IVPB. 3.375 Gram(s) IV Intermittent every 6 hours  sodium chloride 0.9% 500 milliLiter(s) IV Continuous <Continuous>  vasopressin Infusion 0.06 Unit(s)/Min IV Continuous <Continuous>    Culture - Bronchial (collected 09-18-18 @ 09:36)  Source: Bronch Wash Combicath  Gram Stain (09-18-18 @ 11:19):    Numerous polymorphonuclear leukocytes per low power field    Rare Squamous epithelial cells per low power field    Few Gram Positive Cocci in Clusters per oil power field    Mode: AC/ CMV (Assist Control/ Continuous Mandatory Ventilation), RR (machine): 30, TV (machine): 350, FiO2: 60, PEEP: 10, ITime: 0.8, MAP: 18, PIP: 22      Exam same    Brain dead, managed by live on  Need for a line for BP management, until all workup for organ donation     labs and imaging reviewed       Alexandra Gold [facility] attending

## 2018-09-18 NOTE — PROVIDER CONTACT NOTE (OTHER) - ACTION/TREATMENT ORDERED:
As per provider. no interventions at this time. Continue with cuff pressure. Cath flow ordered for central FRANCISCO JAVIER Zarate to give medication. Will continue to monitor

## 2018-09-18 NOTE — PROCEDURE NOTE - NSSITEPREP_SKIN_A_CORE
chlorhexidine
chlorhexidine/Adherence to aseptic technique: hand hygiene prior to donning barriers (gown, gloves), don cap and mask, sterile drape over patient
chlorhexidine
chlorhexidine

## 2018-09-18 NOTE — PROCEDURE NOTE - NSINDICATIONS_GEN_A_CORE
critical illness/emergency venous access/hypertonic/irritant infusion/venous access
arterial puncture to obtain ABG's/blood sampling/critical patient/monitoring purposes
critical patient
critical patient/monitoring purposes

## 2018-09-18 NOTE — PROCEDURE NOTE - NSINFORMCONSENT_GEN_A_CORE
This was an emergent procedure.
Benefits, risks, and possible complications of procedure explained to patient/caregiver who verbalized understanding and gave written consent.
This was an emergent procedure.
This was an emergent procedure.

## 2018-09-19 VITALS
DIASTOLIC BLOOD PRESSURE: 104 MMHG | HEART RATE: 84 BPM | RESPIRATION RATE: 30 BRPM | OXYGEN SATURATION: 100 % | SYSTOLIC BLOOD PRESSURE: 130 MMHG

## 2018-09-19 LAB
ALBUMIN SERPL ELPH-MCNC: 2.4 G/DL — LOW (ref 3.3–5)
ALBUMIN SERPL ELPH-MCNC: 2.5 G/DL — LOW (ref 3.3–5)
ALBUMIN SERPL ELPH-MCNC: 2.8 G/DL — LOW (ref 3.3–5)
ALP SERPL-CCNC: 126 U/L — HIGH (ref 40–120)
ALP SERPL-CCNC: 131 U/L — HIGH (ref 40–120)
ALP SERPL-CCNC: 149 U/L — HIGH (ref 40–120)
ALT FLD-CCNC: 47 U/L — HIGH (ref 10–45)
ALT FLD-CCNC: 64 U/L — HIGH (ref 10–45)
ALT FLD-CCNC: 89 U/L — HIGH (ref 10–45)
AMYLASE P1 CFR SERPL: 20 U/L — LOW (ref 25–125)
AMYLASE P1 CFR SERPL: 23 U/L — LOW (ref 25–125)
AMYLASE P1 CFR SERPL: 23 U/L — LOW (ref 25–125)
ANION GAP SERPL CALC-SCNC: 10 MMOL/L — SIGNIFICANT CHANGE UP (ref 5–17)
ANION GAP SERPL CALC-SCNC: 12 MMOL/L — SIGNIFICANT CHANGE UP (ref 5–17)
ANION GAP SERPL CALC-SCNC: 13 MMOL/L — SIGNIFICANT CHANGE UP (ref 5–17)
APPEARANCE UR: CLEAR — SIGNIFICANT CHANGE UP
APTT BLD: 24.5 SEC — LOW (ref 27.5–37.4)
APTT BLD: 24.5 SEC — LOW (ref 27.5–37.4)
APTT BLD: 24.9 SEC — LOW (ref 27.5–37.4)
APTT BLD: 25.6 SEC — LOW (ref 27.5–37.4)
AST SERPL-CCNC: 103 U/L — HIGH (ref 10–40)
AST SERPL-CCNC: 52 U/L — HIGH (ref 10–40)
AST SERPL-CCNC: 73 U/L — HIGH (ref 10–40)
BASE EXCESS BLDA CALC-SCNC: 1.4 MMOL/L — SIGNIFICANT CHANGE UP (ref -2–2)
BASE EXCESS BLDA CALC-SCNC: 2.3 MMOL/L — HIGH (ref -2–2)
BASOPHILS # BLD AUTO: 0 K/UL — SIGNIFICANT CHANGE UP (ref 0–0.2)
BASOPHILS NFR BLD AUTO: 0 % — SIGNIFICANT CHANGE UP (ref 0–2)
BASOPHILS NFR BLD AUTO: 0.1 % — SIGNIFICANT CHANGE UP (ref 0–2)
BILIRUB DIRECT SERPL-MCNC: 0.2 MG/DL — SIGNIFICANT CHANGE UP (ref 0–0.2)
BILIRUB DIRECT SERPL-MCNC: 0.2 MG/DL — SIGNIFICANT CHANGE UP (ref 0–0.2)
BILIRUB DIRECT SERPL-MCNC: 0.3 MG/DL — HIGH (ref 0–0.2)
BILIRUB DIRECT SERPL-MCNC: <0.1 MG/DL — SIGNIFICANT CHANGE UP (ref 0–0.2)
BILIRUB SERPL-MCNC: 0.5 MG/DL — SIGNIFICANT CHANGE UP (ref 0.2–1.2)
BILIRUB SERPL-MCNC: 0.5 MG/DL — SIGNIFICANT CHANGE UP (ref 0.2–1.2)
BILIRUB SERPL-MCNC: 0.7 MG/DL — SIGNIFICANT CHANGE UP (ref 0.2–1.2)
BILIRUB UR-MCNC: NEGATIVE — SIGNIFICANT CHANGE UP
BUN SERPL-MCNC: 22 MG/DL — SIGNIFICANT CHANGE UP (ref 7–23)
BUN SERPL-MCNC: 23 MG/DL — SIGNIFICANT CHANGE UP (ref 7–23)
BUN SERPL-MCNC: 23 MG/DL — SIGNIFICANT CHANGE UP (ref 7–23)
C DIFF GDH STL QL: NEGATIVE — SIGNIFICANT CHANGE UP
C DIFF GDH STL QL: SIGNIFICANT CHANGE UP
CALCIUM SERPL-MCNC: 8.6 MG/DL — SIGNIFICANT CHANGE UP (ref 8.4–10.5)
CALCIUM SERPL-MCNC: 8.7 MG/DL — SIGNIFICANT CHANGE UP (ref 8.4–10.5)
CALCIUM SERPL-MCNC: 9 MG/DL — SIGNIFICANT CHANGE UP (ref 8.4–10.5)
CHLORIDE SERPL-SCNC: 105 MMOL/L — SIGNIFICANT CHANGE UP (ref 96–108)
CHLORIDE SERPL-SCNC: 106 MMOL/L — SIGNIFICANT CHANGE UP (ref 96–108)
CHLORIDE SERPL-SCNC: 106 MMOL/L — SIGNIFICANT CHANGE UP (ref 96–108)
CK MB BLD-MCNC: 2 % — SIGNIFICANT CHANGE UP (ref 0–3.5)
CK MB BLD-MCNC: 2.4 % — SIGNIFICANT CHANGE UP (ref 0–3.5)
CK MB CFR SERPL CALC: 2.8 NG/ML — SIGNIFICANT CHANGE UP (ref 0–3.8)
CK MB CFR SERPL CALC: 3.1 NG/ML — SIGNIFICANT CHANGE UP (ref 0–3.8)
CK MB CFR SERPL CALC: 3.2 NG/ML — SIGNIFICANT CHANGE UP (ref 0–3.8)
CK SERPL-CCNC: 113 U/L — SIGNIFICANT CHANGE UP (ref 25–170)
CK SERPL-CCNC: 127 U/L — SIGNIFICANT CHANGE UP (ref 25–170)
CK SERPL-CCNC: 143 U/L — SIGNIFICANT CHANGE UP (ref 25–170)
CO2 BLDA-SCNC: 27 MMOL/L — SIGNIFICANT CHANGE UP (ref 22–30)
CO2 BLDA-SCNC: 27 MMOL/L — SIGNIFICANT CHANGE UP (ref 22–30)
CO2 SERPL-SCNC: 23 MMOL/L — SIGNIFICANT CHANGE UP (ref 22–31)
CO2 SERPL-SCNC: 23 MMOL/L — SIGNIFICANT CHANGE UP (ref 22–31)
CO2 SERPL-SCNC: 25 MMOL/L — SIGNIFICANT CHANGE UP (ref 22–31)
COLOR SPEC: YELLOW — SIGNIFICANT CHANGE UP
CREAT SERPL-MCNC: 0.49 MG/DL — LOW (ref 0.5–1.3)
CREAT SERPL-MCNC: 0.5 MG/DL — SIGNIFICANT CHANGE UP (ref 0.5–1.3)
CREAT SERPL-MCNC: 0.53 MG/DL — SIGNIFICANT CHANGE UP (ref 0.5–1.3)
DIFF PNL FLD: ABNORMAL
EOSINOPHIL # BLD AUTO: 0 K/UL — SIGNIFICANT CHANGE UP (ref 0–0.5)
EOSINOPHIL NFR BLD AUTO: 0.1 % — SIGNIFICANT CHANGE UP (ref 0–6)
EOSINOPHIL NFR BLD AUTO: 0.1 % — SIGNIFICANT CHANGE UP (ref 0–6)
FIBRINOGEN PPP-MCNC: 1042 MG/DL — HIGH (ref 310–510)
FIBRINOGEN PPP-MCNC: 1044 MG/DL — HIGH (ref 310–510)
FIBRINOGEN PPP-MCNC: 1084 MG/DL — HIGH (ref 310–510)
FIBRINOGEN PPP-MCNC: 1116 MG/DL — HIGH (ref 310–510)
GAS PNL BLDA: SIGNIFICANT CHANGE UP
GAS PNL BLDA: SIGNIFICANT CHANGE UP
GGT SERPL-CCNC: 103 U/L — HIGH (ref 8–40)
GGT SERPL-CCNC: 116 U/L — HIGH (ref 8–40)
GGT SERPL-CCNC: 87 U/L — HIGH (ref 8–40)
GGT SERPL-CCNC: 93 U/L — HIGH (ref 8–40)
GLUCOSE SERPL-MCNC: 137 MG/DL — HIGH (ref 70–99)
GLUCOSE SERPL-MCNC: 139 MG/DL — HIGH (ref 70–99)
GLUCOSE SERPL-MCNC: 152 MG/DL — HIGH (ref 70–99)
GLUCOSE UR QL: NEGATIVE — SIGNIFICANT CHANGE UP
GRAM STN FLD: SIGNIFICANT CHANGE UP
HCO3 BLDA-SCNC: 26 MMOL/L — SIGNIFICANT CHANGE UP (ref 21–29)
HCO3 BLDA-SCNC: 26 MMOL/L — SIGNIFICANT CHANGE UP (ref 21–29)
HCT VFR BLD CALC: 24.1 % — LOW (ref 34.5–45)
HCT VFR BLD CALC: 25.4 % — LOW (ref 34.5–45)
HCT VFR BLD CALC: 25.7 % — LOW (ref 34.5–45)
HGB BLD-MCNC: 8.3 G/DL — LOW (ref 11.5–15.5)
HGB BLD-MCNC: 8.3 G/DL — LOW (ref 11.5–15.5)
HGB BLD-MCNC: 8.6 G/DL — LOW (ref 11.5–15.5)
HOROWITZ INDEX BLDA+IHG-RTO: 60 — SIGNIFICANT CHANGE UP
HOROWITZ INDEX BLDA+IHG-RTO: 60 — SIGNIFICANT CHANGE UP
INR BLD: 1.29 RATIO — HIGH (ref 0.88–1.16)
INR BLD: 1.29 RATIO — HIGH (ref 0.88–1.16)
INR BLD: 1.3 RATIO — HIGH (ref 0.88–1.16)
INR BLD: 1.32 RATIO — HIGH (ref 0.88–1.16)
KETONES UR-MCNC: NEGATIVE — SIGNIFICANT CHANGE UP
LDH SERPL L TO P-CCNC: 770 U/L — HIGH (ref 50–242)
LDH SERPL L TO P-CCNC: 816 U/L — HIGH (ref 50–242)
LDH SERPL L TO P-CCNC: 834 U/L — HIGH (ref 50–242)
LEUKOCYTE ESTERASE UR-ACNC: NEGATIVE — SIGNIFICANT CHANGE UP
LIDOCAIN IGE QN: 8 U/L — SIGNIFICANT CHANGE UP (ref 7–60)
LIDOCAIN IGE QN: 8 U/L — SIGNIFICANT CHANGE UP (ref 7–60)
LIDOCAIN IGE QN: 9 U/L — SIGNIFICANT CHANGE UP (ref 7–60)
LYMPHOCYTES # BLD AUTO: 0.8 K/UL — LOW (ref 1–3.3)
LYMPHOCYTES # BLD AUTO: 0.9 K/UL — LOW (ref 1–3.3)
LYMPHOCYTES # BLD AUTO: 0.9 K/UL — LOW (ref 1–3.3)
LYMPHOCYTES # BLD AUTO: 6.5 % — LOW (ref 13–44)
LYMPHOCYTES # BLD AUTO: 7 % — LOW (ref 13–44)
LYMPHOCYTES # BLD AUTO: 7.2 % — LOW (ref 13–44)
MAGNESIUM SERPL-MCNC: 2 MG/DL — SIGNIFICANT CHANGE UP (ref 1.6–2.6)
MAGNESIUM SERPL-MCNC: 2.1 MG/DL — SIGNIFICANT CHANGE UP (ref 1.6–2.6)
MAGNESIUM SERPL-MCNC: 2.1 MG/DL — SIGNIFICANT CHANGE UP (ref 1.6–2.6)
MCHC RBC-ENTMCNC: 30.1 PG — SIGNIFICANT CHANGE UP (ref 27–34)
MCHC RBC-ENTMCNC: 31 PG — SIGNIFICANT CHANGE UP (ref 27–34)
MCHC RBC-ENTMCNC: 31.8 PG — SIGNIFICANT CHANGE UP (ref 27–34)
MCHC RBC-ENTMCNC: 32.7 GM/DL — SIGNIFICANT CHANGE UP (ref 32–36)
MCHC RBC-ENTMCNC: 33.4 GM/DL — SIGNIFICANT CHANGE UP (ref 32–36)
MCHC RBC-ENTMCNC: 34.6 GM/DL — SIGNIFICANT CHANGE UP (ref 32–36)
MCV RBC AUTO: 91.9 FL — SIGNIFICANT CHANGE UP (ref 80–100)
MCV RBC AUTO: 91.9 FL — SIGNIFICANT CHANGE UP (ref 80–100)
MCV RBC AUTO: 92.9 FL — SIGNIFICANT CHANGE UP (ref 80–100)
MONOCYTES # BLD AUTO: 0.6 K/UL — SIGNIFICANT CHANGE UP (ref 0–0.9)
MONOCYTES # BLD AUTO: 0.7 K/UL — SIGNIFICANT CHANGE UP (ref 0–0.9)
MONOCYTES # BLD AUTO: 0.8 K/UL — SIGNIFICANT CHANGE UP (ref 0–0.9)
MONOCYTES NFR BLD AUTO: 5 % — SIGNIFICANT CHANGE UP (ref 2–14)
MONOCYTES NFR BLD AUTO: 5 % — SIGNIFICANT CHANGE UP (ref 2–14)
MONOCYTES NFR BLD AUTO: 6.4 % — SIGNIFICANT CHANGE UP (ref 2–14)
NEUTROPHILS # BLD AUTO: 11.1 K/UL — HIGH (ref 1.8–7.4)
NEUTROPHILS # BLD AUTO: 12.3 K/UL — HIGH (ref 1.8–7.4)
NEUTROPHILS # BLD AUTO: 12.4 K/UL — HIGH (ref 1.8–7.4)
NEUTROPHILS NFR BLD AUTO: 86.2 % — HIGH (ref 43–77)
NEUTROPHILS NFR BLD AUTO: 88 % — HIGH (ref 43–77)
NEUTROPHILS NFR BLD AUTO: 88.3 % — HIGH (ref 43–77)
NITRITE UR-MCNC: NEGATIVE — SIGNIFICANT CHANGE UP
OSMOLALITY SERPL: 302 MOS/KG — HIGH (ref 275–300)
PCO2 BLDA: 38 MMHG — SIGNIFICANT CHANGE UP (ref 32–46)
PCO2 BLDA: 42 MMHG — SIGNIFICANT CHANGE UP (ref 32–46)
PH BLDA: 7.4 — SIGNIFICANT CHANGE UP (ref 7.35–7.45)
PH BLDA: 7.44 — SIGNIFICANT CHANGE UP (ref 7.35–7.45)
PH UR: 7 — SIGNIFICANT CHANGE UP (ref 5–8)
PHOSPHATE SERPL-MCNC: 3 MG/DL — SIGNIFICANT CHANGE UP (ref 2.5–4.5)
PHOSPHATE SERPL-MCNC: 3.4 MG/DL — SIGNIFICANT CHANGE UP (ref 2.5–4.5)
PHOSPHATE SERPL-MCNC: 3.5 MG/DL — SIGNIFICANT CHANGE UP (ref 2.5–4.5)
PLATELET # BLD AUTO: 204 K/UL — SIGNIFICANT CHANGE UP (ref 150–400)
PLATELET # BLD AUTO: 220 K/UL — SIGNIFICANT CHANGE UP (ref 150–400)
PLATELET # BLD AUTO: 222 K/UL — SIGNIFICANT CHANGE UP (ref 150–400)
PO2 BLDA: 213 MMHG — HIGH (ref 74–108)
PO2 BLDA: 219 MMHG — HIGH (ref 74–108)
POTASSIUM SERPL-MCNC: 3.8 MMOL/L — SIGNIFICANT CHANGE UP (ref 3.5–5.3)
POTASSIUM SERPL-MCNC: 3.8 MMOL/L — SIGNIFICANT CHANGE UP (ref 3.5–5.3)
POTASSIUM SERPL-MCNC: 3.9 MMOL/L — SIGNIFICANT CHANGE UP (ref 3.5–5.3)
POTASSIUM SERPL-SCNC: 3.8 MMOL/L — SIGNIFICANT CHANGE UP (ref 3.5–5.3)
POTASSIUM SERPL-SCNC: 3.8 MMOL/L — SIGNIFICANT CHANGE UP (ref 3.5–5.3)
POTASSIUM SERPL-SCNC: 3.9 MMOL/L — SIGNIFICANT CHANGE UP (ref 3.5–5.3)
PROT SERPL-MCNC: 5.7 G/DL — LOW (ref 6–8.3)
PROT SERPL-MCNC: 5.8 G/DL — LOW (ref 6–8.3)
PROT SERPL-MCNC: 5.8 G/DL — LOW (ref 6–8.3)
PROT UR-MCNC: ABNORMAL
PROTHROM AB SERPL-ACNC: 14 SEC — HIGH (ref 9.8–12.7)
PROTHROM AB SERPL-ACNC: 14.1 SEC — HIGH (ref 9.8–12.7)
PROTHROM AB SERPL-ACNC: 14.3 SEC — HIGH (ref 9.8–12.7)
PROTHROM AB SERPL-ACNC: 14.3 SEC — HIGH (ref 9.8–12.7)
RBC # BLD: 2.62 M/UL — LOW (ref 3.8–5.2)
RBC # BLD: 2.77 M/UL — LOW (ref 3.8–5.2)
RBC # BLD: 2.77 M/UL — LOW (ref 3.8–5.2)
RBC # FLD: 12.5 % — SIGNIFICANT CHANGE UP (ref 10.3–14.5)
RBC # FLD: 12.9 % — SIGNIFICANT CHANGE UP (ref 10.3–14.5)
RBC # FLD: 13 % — SIGNIFICANT CHANGE UP (ref 10.3–14.5)
SAO2 % BLDA: 100 % — HIGH (ref 92–96)
SAO2 % BLDA: 99 % — HIGH (ref 92–96)
SODIUM SERPL-SCNC: 140 MMOL/L — SIGNIFICANT CHANGE UP (ref 135–145)
SODIUM SERPL-SCNC: 141 MMOL/L — SIGNIFICANT CHANGE UP (ref 135–145)
SODIUM SERPL-SCNC: 142 MMOL/L — SIGNIFICANT CHANGE UP (ref 135–145)
SP GR SPEC: 1.03 — SIGNIFICANT CHANGE UP
SP GR UR STRIP: 1.03 — HIGH (ref 1.01–1.02)
SPECIMEN SOURCE: SIGNIFICANT CHANGE UP
TROPONIN T, HIGH SENSITIVITY RESULT: 39 NG/L — SIGNIFICANT CHANGE UP (ref 0–51)
TROPONIN T, HIGH SENSITIVITY RESULT: 39 NG/L — SIGNIFICANT CHANGE UP (ref 0–51)
TROPONIN T, HIGH SENSITIVITY RESULT: 43 NG/L — SIGNIFICANT CHANGE UP (ref 0–51)
UROBILINOGEN FLD QL: ABNORMAL
WBC # BLD: 12.8 K/UL — HIGH (ref 3.8–10.5)
WBC # BLD: 13.9 K/UL — HIGH (ref 3.8–10.5)
WBC # BLD: 14 K/UL — HIGH (ref 3.8–10.5)
WBC # FLD AUTO: 12.8 K/UL — HIGH (ref 3.8–10.5)
WBC # FLD AUTO: 13.9 K/UL — HIGH (ref 3.8–10.5)
WBC # FLD AUTO: 14 K/UL — HIGH (ref 3.8–10.5)

## 2018-09-19 PROCEDURE — 71045 X-RAY EXAM CHEST 1 VIEW: CPT | Mod: 26

## 2018-09-19 RX ADMIN — Medication 100 MILLIGRAM(S): at 12:44

## 2018-09-19 RX ADMIN — PIPERACILLIN AND TAZOBACTAM 25 GRAM(S): 4; .5 INJECTION, POWDER, LYOPHILIZED, FOR SOLUTION INTRAVENOUS at 06:06

## 2018-09-19 RX ADMIN — PIPERACILLIN AND TAZOBACTAM 25 GRAM(S): 4; .5 INJECTION, POWDER, LYOPHILIZED, FOR SOLUTION INTRAVENOUS at 17:35

## 2018-09-19 RX ADMIN — PIPERACILLIN AND TAZOBACTAM 25 GRAM(S): 4; .5 INJECTION, POWDER, LYOPHILIZED, FOR SOLUTION INTRAVENOUS at 12:43

## 2018-09-20 LAB
-  AMPICILLIN/SULBACTAM: SIGNIFICANT CHANGE UP
-  AMPICILLIN/SULBACTAM: SIGNIFICANT CHANGE UP
-  CEFAZOLIN: SIGNIFICANT CHANGE UP
-  CEFAZOLIN: SIGNIFICANT CHANGE UP
-  CLINDAMYCIN: SIGNIFICANT CHANGE UP
-  CLINDAMYCIN: SIGNIFICANT CHANGE UP
-  COAGULASE NEGATIVE STAPHYLOCOCCUS: SIGNIFICANT CHANGE UP
-  ERYTHROMYCIN: SIGNIFICANT CHANGE UP
-  ERYTHROMYCIN: SIGNIFICANT CHANGE UP
-  GENTAMICIN: SIGNIFICANT CHANGE UP
-  GENTAMICIN: SIGNIFICANT CHANGE UP
-  LINEZOLID: SIGNIFICANT CHANGE UP
-  LINEZOLID: SIGNIFICANT CHANGE UP
-  OXACILLIN: SIGNIFICANT CHANGE UP
-  OXACILLIN: SIGNIFICANT CHANGE UP
-  PENICILLIN: SIGNIFICANT CHANGE UP
-  PENICILLIN: SIGNIFICANT CHANGE UP
-  RIFAMPIN: SIGNIFICANT CHANGE UP
-  RIFAMPIN: SIGNIFICANT CHANGE UP
-  TETRACYCLINE: SIGNIFICANT CHANGE UP
-  TETRACYCLINE: SIGNIFICANT CHANGE UP
-  TRIMETHOPRIM/SULFAMETHOXAZOLE: SIGNIFICANT CHANGE UP
-  TRIMETHOPRIM/SULFAMETHOXAZOLE: SIGNIFICANT CHANGE UP
-  VANCOMYCIN: SIGNIFICANT CHANGE UP
-  VANCOMYCIN: SIGNIFICANT CHANGE UP
CULTURE RESULTS: NO GROWTH — SIGNIFICANT CHANGE UP
CULTURE RESULTS: SIGNIFICANT CHANGE UP
CULTURE RESULTS: SIGNIFICANT CHANGE UP
GGT SERPL-CCNC: 135 U/L — HIGH (ref 8–40)
GRAM STN FLD: SIGNIFICANT CHANGE UP
METHOD TYPE: SIGNIFICANT CHANGE UP
ORGANISM # SPEC MICROSCOPIC CNT: SIGNIFICANT CHANGE UP
SPECIMEN SOURCE: SIGNIFICANT CHANGE UP

## 2018-09-20 PROCEDURE — 94003 VENT MGMT INPAT SUBQ DAY: CPT

## 2018-09-20 PROCEDURE — 85014 HEMATOCRIT: CPT

## 2018-09-20 PROCEDURE — 84480 ASSAY TRIIODOTHYRONINE (T3): CPT

## 2018-09-20 PROCEDURE — 82803 BLOOD GASES ANY COMBINATION: CPT

## 2018-09-20 PROCEDURE — 80048 BASIC METABOLIC PNL TOTAL CA: CPT

## 2018-09-20 PROCEDURE — 87150 DNA/RNA AMPLIFIED PROBE: CPT

## 2018-09-20 PROCEDURE — 78803 RP LOCLZJ TUM SPECT 1 AREA: CPT

## 2018-09-20 PROCEDURE — P9016: CPT

## 2018-09-20 PROCEDURE — 83605 ASSAY OF LACTIC ACID: CPT

## 2018-09-20 PROCEDURE — 83036 HEMOGLOBIN GLYCOSYLATED A1C: CPT

## 2018-09-20 PROCEDURE — 86850 RBC ANTIBODY SCREEN: CPT

## 2018-09-20 PROCEDURE — 84436 ASSAY OF TOTAL THYROXINE: CPT

## 2018-09-20 PROCEDURE — 86923 COMPATIBILITY TEST ELECTRIC: CPT

## 2018-09-20 PROCEDURE — 82435 ASSAY OF BLOOD CHLORIDE: CPT

## 2018-09-20 PROCEDURE — 94799 UNLISTED PULMONARY SVC/PX: CPT

## 2018-09-20 PROCEDURE — 84132 ASSAY OF SERUM POTASSIUM: CPT

## 2018-09-20 PROCEDURE — 82977 ASSAY OF GGT: CPT

## 2018-09-20 PROCEDURE — 84702 CHORIONIC GONADOTROPIN TEST: CPT

## 2018-09-20 PROCEDURE — A9521: CPT

## 2018-09-20 PROCEDURE — C8924: CPT

## 2018-09-20 PROCEDURE — 87449 NOS EACH ORGANISM AG IA: CPT

## 2018-09-20 PROCEDURE — 93886 INTRACRANIAL COMPLETE STUDY: CPT

## 2018-09-20 PROCEDURE — 87070 CULTURE OTHR SPECIMN AEROBIC: CPT

## 2018-09-20 PROCEDURE — 83930 ASSAY OF BLOOD OSMOLALITY: CPT

## 2018-09-20 PROCEDURE — 82553 CREATINE MB FRACTION: CPT

## 2018-09-20 PROCEDURE — 93005 ELECTROCARDIOGRAM TRACING: CPT

## 2018-09-20 PROCEDURE — 83690 ASSAY OF LIPASE: CPT

## 2018-09-20 PROCEDURE — 94002 VENT MGMT INPAT INIT DAY: CPT

## 2018-09-20 PROCEDURE — 71045 X-RAY EXAM CHEST 1 VIEW: CPT

## 2018-09-20 PROCEDURE — 84295 ASSAY OF SERUM SODIUM: CPT

## 2018-09-20 PROCEDURE — 82962 GLUCOSE BLOOD TEST: CPT

## 2018-09-20 PROCEDURE — 83735 ASSAY OF MAGNESIUM: CPT

## 2018-09-20 PROCEDURE — 83935 ASSAY OF URINE OSMOLALITY: CPT

## 2018-09-20 PROCEDURE — 86900 BLOOD TYPING SEROLOGIC ABO: CPT

## 2018-09-20 PROCEDURE — 83615 LACTATE (LD) (LDH) ENZYME: CPT

## 2018-09-20 PROCEDURE — 81001 URINALYSIS AUTO W/SCOPE: CPT

## 2018-09-20 PROCEDURE — 87040 BLOOD CULTURE FOR BACTERIA: CPT

## 2018-09-20 PROCEDURE — 87186 SC STD MICRODIL/AGAR DIL: CPT

## 2018-09-20 PROCEDURE — 87324 CLOSTRIDIUM AG IA: CPT

## 2018-09-20 PROCEDURE — 93306 TTE W/DOPPLER COMPLETE: CPT

## 2018-09-20 PROCEDURE — 85730 THROMBOPLASTIN TIME PARTIAL: CPT

## 2018-09-20 PROCEDURE — 80307 DRUG TEST PRSMV CHEM ANLYZR: CPT

## 2018-09-20 PROCEDURE — 93970 EXTREMITY STUDY: CPT

## 2018-09-20 PROCEDURE — 85610 PROTHROMBIN TIME: CPT

## 2018-09-20 PROCEDURE — 36430 TRANSFUSION BLD/BLD COMPNT: CPT

## 2018-09-20 PROCEDURE — 82330 ASSAY OF CALCIUM: CPT

## 2018-09-20 PROCEDURE — 87086 URINE CULTURE/COLONY COUNT: CPT

## 2018-09-20 PROCEDURE — 95819 EEG AWAKE AND ASLEEP: CPT

## 2018-09-20 PROCEDURE — 82150 ASSAY OF AMYLASE: CPT

## 2018-09-20 PROCEDURE — 93321 DOPPLER ECHO F-UP/LMTD STD: CPT

## 2018-09-20 PROCEDURE — 84484 ASSAY OF TROPONIN QUANT: CPT

## 2018-09-20 PROCEDURE — 82947 ASSAY GLUCOSE BLOOD QUANT: CPT

## 2018-09-20 PROCEDURE — C8929: CPT

## 2018-09-20 PROCEDURE — 81005 URINALYSIS: CPT

## 2018-09-20 PROCEDURE — 84100 ASSAY OF PHOSPHORUS: CPT

## 2018-09-20 PROCEDURE — 84443 ASSAY THYROID STIM HORMONE: CPT

## 2018-09-20 PROCEDURE — 80061 LIPID PANEL: CPT

## 2018-09-20 PROCEDURE — 80053 COMPREHEN METABOLIC PANEL: CPT

## 2018-09-20 PROCEDURE — 76700 US EXAM ABDOM COMPLETE: CPT

## 2018-09-20 PROCEDURE — 85384 FIBRINOGEN ACTIVITY: CPT

## 2018-09-20 PROCEDURE — 85027 COMPLETE CBC AUTOMATED: CPT

## 2018-09-20 PROCEDURE — 94640 AIRWAY INHALATION TREATMENT: CPT

## 2018-09-20 PROCEDURE — 82248 BILIRUBIN DIRECT: CPT

## 2018-09-20 PROCEDURE — 82550 ASSAY OF CK (CPK): CPT

## 2018-09-20 PROCEDURE — 86901 BLOOD TYPING SEROLOGIC RH(D): CPT

## 2018-09-21 LAB
CULTURE RESULTS: SIGNIFICANT CHANGE UP
ORGANISM # SPEC MICROSCOPIC CNT: SIGNIFICANT CHANGE UP
ORGANISM # SPEC MICROSCOPIC CNT: SIGNIFICANT CHANGE UP
SPECIMEN SOURCE: SIGNIFICANT CHANGE UP

## 2018-09-23 LAB
CULTURE RESULTS: SIGNIFICANT CHANGE UP
SPECIMEN SOURCE: SIGNIFICANT CHANGE UP

## 2018-09-24 LAB
CULTURE RESULTS: SIGNIFICANT CHANGE UP
CULTURE RESULTS: SIGNIFICANT CHANGE UP
SPECIMEN SOURCE: SIGNIFICANT CHANGE UP
SPECIMEN SOURCE: SIGNIFICANT CHANGE UP

## 2019-10-08 NOTE — H&P ADULT - HISTORY OF PRESENT ILLNESS
NON-SMOKER. 32 yo F with no pmhx BIBEMS to [place] unconscious.  According to boyfriend, patient experienced WHOL while out to dinner and took Tylenol without relief 34 yo F with no pmhx BIBEMS to [place] unconscious.  According to OSH and boyfriend, patient experienced WHOL while out to dinner and took Tylenol without relief.  Patient went to bed and boyfriend felt bed shaking to find patient unresponsive.  EMS called.  Patient Ambu bagged upon arrival to OSH with GCS 3t.  Patient transferred to [facility] for further management. CT head demonstrated diffuse MF4 SAH and CXR demonstrated ARDS.

## 2019-12-18 NOTE — PATIENT PROFILE ADULT. - NSNUTRITIONRISK_GI_A_CORE
No indicators present Pre-Excision Curettage Text (Leave Blank If You Do Not Want): Prior to drawing the surgical margin the visible lesion was removed with electrodesiccation and curettage to clearly define the lesion size.

## 2022-04-05 NOTE — PATIENT PROFILE ADULT. - PRO ARRIVE FROM
Report called to [facility] ER spoke with Jessica JAMES, patient will present via private auto.     home

## 2023-01-26 NOTE — PROGRESS NOTE ADULT - SUBJECTIVE AND OBJECTIVE BOX
Called by NSCU team to meet with patient's family to offer support and help with coping, acceptance of situation    PERTINENT PM/SXH:   No pertinent past medical history    No significant past surgical history    SOCIAL HISTORY:   Significant other/partner:  [ ]YES  [X]NO*  Children:  [ ]YES  [X]NO*  Orthodox/Spirituality:  Substance hx:  [ ]YES  [ ]NO*  Tobacco hx:  [ ]YES  [ ]NO*  Alcohol hx: [ ]YES  [ ]NO*    Home Opioid hx:  [ ]YES  [ ]NO   Living Situation: [ ]Home  [ ]Long term care  [ ]Rehab [ ]Other    FAMILY HISTORY:  No pertinent family history in first degree relatives    [ ]Family history non-contributory     BASELINE (I)ADLs (prior to admission):  [place]: [ ]total  [ ] moderate [ ]dependent    ADVANCE DIRECTIVES:    DNR   MOLST  [ ]YES [ ]NO                      [ ]Completed  Health Care Proxy [ ]YES  [ ]NO                   [ ]Completed  Living Will  [ ]YES [ ]NO           [ ]Surrogate  [ ]HCP  [ ]Guardian:  Phone#:    Allergies    No Known Allergies    Intolerances      MEDICATIONS  (STANDING):  artificial tears (preservative free) Ophthalmic Solution 1 Drop(s) Both EYES every 8 hours  chlorhexidine 0.12% Liquid 15 milliLiter(s) Swish and Spit two times a day  enoxaparin Injectable 40 milliGRAM(s) SubCutaneous <User Schedule>  hydrocortisone sodium succinate Injectable 50 milliGRAM(s) IV Push every 8 hours  influenza   Vaccine 0.5 milliLiter(s) IntraMuscular once  levETIRAcetam 1000 milliGRAM(s) Oral two times a day  levothyroxine Infusion 20 MICROgram(s)/Hr (50 mL/Hr) IV Continuous <Continuous>  milrinone Infusion 0.25 MICROgram(s)/kG/Min (5.055 mL/Hr) IV Continuous <Continuous>  norepinephrine Infusion 0.05 MICROgram(s)/kG/Min (3.159 mL/Hr) IV Continuous <Continuous>  pantoprazole  Injectable 40 milliGRAM(s) IV Push daily  vasopressin Infusion 0.03 Unit(s)/Min (1.8 mL/Hr) IV Continuous <Continuous>    MEDICATIONS  (PRN):  acetaminophen    Suspension .. 650 milliGRAM(s) Oral every 6 hours PRN Temp greater or equal to 38C (100.4F), Mild Pain (1 - 3)      PRESENT SYMPTOMS:  Source: [ ]Patient   [ ]Family   [ ]Team     Pain:                        [ ]No [ ]Yes             [ ]Mild [ ]Moderate [ ]Severe  Onset -  Location -  Duration -  Character -  Alleviating/Aggravating -  Radiation -  Timing -    Dyspnea:                [ ]No [ ]Yes             [ ]Mild [ ]Moderate [ ]Severe  Anxiety:                  [ ]No [ ]Yes             [ ]Mild [ ]Moderate [ ]Severe  Fatigue:                  [ ]No [ ]Yes             [ ]Mild [ ]Moderate [ ]Severe  Nausea:                  [ ]No [ ]Yes             [ ]Mild [ ]Moderate [ ]Severe  Loss of appetite:   [ ]No [ ]Yes             [ ]Mild [ ]Moderate [ ]Severe  Constipation:        [ ]No [ ]Yes             [ ]Mild [ ]Moderate [ ]Severe    Other Symptoms:  [ ]All other review of systems negative   [X]Unable to obtain due to poor mentation     Karnofsky Performance Score/Palliative Performance Status Version 2:         %  PHYSICAL EXAM:  Vital Signs Last 24 Hrs  T(C): 36.3 (16 Sep 2018 11:00), Max: 37.4 (15 Sep 2018 23:00)  T(F): 97.3 (16 Sep 2018 11:00), Max: 99.3 (15 Sep 2018 23:00)  HR: 94 (16 Sep 2018 14:00) (91 - 111)  BP: 121/89 (16 Sep 2018 14:00) (108/90 - 125/92)  BP(mean): 98 (16 Sep 2018 14:00) (83 - 103)  RR: 26 (16 Sep 2018 14:00) (19 - 26)  SpO2: 98% (16 Sep 2018 14:00) (95% - 100%) I&O's Summary    15 Sep 2018 07:01  -  16 Sep 2018 07:00  --------------------------------------------------------  IN: 3109.6 mL / OUT: 1270 mL / NET: 1839.6 mL    16 Sep 2018 07:01  -  16 Sep 2018 14:21  --------------------------------------------------------  IN: 1373.1 mL / OUT: 435 mL / NET: 938.1 mL      General:  [ ]Alert  [ ]Oriented x   [ ]Lethargic  [ ]Agitated   [ ]Cachexia   [X]Unarousable  [ ]Verbal  [X]Non-Verbal    HEENT:  [ ]Normal   [ ]Dry mouth   [X]ET Tube/Trach  [ ]Oral lesions    Lungs:   [ ]Clear [ ]Tachypnea  [ ]Audible excessive secretions   [ ]Rhonchi        [ ]Right [ ]Left [ ]Bilateral  [ ]Crackles        [ ]Right [ ]Left [ ]Bilateral  [ ]Wheezing     [ ]Right [ ]Left [ ]Bilateral    Cardiovascular:  [X]Regular [ ]Irregular [ ]Tachycardia  [ ]Bradycardia  [ ]Murmur [ ]Other  Abdomen: [ ]Soft  [ ]Distended   [ ]+BS  [ ]Non tender [ ]Tender  [ ]PEG/]OGT/ NGT   Last BM:     Genitourinary: [ ]Normal [  Incontinent   [ ]Oliguria/Anuria   [X]Cole  Musculoskeletal:  [ ]Normal   [ ]Weakness  [ X]Bedbound/Wheelchair bound [ ]Edema  Neurological: [ ]No focal deficits  [ ] Cognitive impairment  [ ] Dysphagia [ ]Dysarthria [ ] Paresis [ ]Other unconcious    Skin: [X]Normal   [ ]Pressure ulcer(s)  [ ]Rash    LABS:                        8.2    15.2  )-----------( 168      ( 15 Sep 2018 22:58 )             25.2     09-15    146<H>  |  112<H>  |  20  ----------------------------<  114<H>  4.0   |  24  |  0.65    Ca    8.4      15 Sep 2018 22:58  Phos  2.9     09-15  Mg     2.0     09-15            Shock: [ ]Septic [ ]Cardiogenic [ ]Neurologic [ ]Hypovolemic  Vasopressors x   Inotrops x     Protein Calorie Malnutrition: [ ]Mild [ ]Moderatw [ ]Severe    Oral Intake: [ ]Unable/mouth care only [ ]Minimal [ ]Moderate [ ]Full Capability  Diet: [ ]NPO [ ]Tube feeds [ ]TPN [ ]Other     RADIOLOGY & ADDITIONAL STUDIES:    REFERRALS:   [X]Chaplaincy  [  Hospice  [ ]Child Life  [ ]Social Work  [ ]Case management [ ]Holistic Therapy spouse

## 2023-05-06 NOTE — ED ADULT NURSE NOTE - EXTENSIONS OF SELF_ADULT
Problem: Physical Therapy  Goal: Physical Therapy Goal  Description: Goals to be met by: 2023     Patient will increase functional independence with mobility by performin. Supine to sit with Set-up [place]  2. Sit to supine with Set-up [place]  3. Sit to stand transfer with Supervision  4. Bed to chair transfer with Supervision using Keanu-walker  5. Gait  x 10 feet with Contact Guard Assistance using Keanu-walker.   6. Wheelchair propulsion x150 feet with Supervision using  right upper extremity and left lower extremity  7. Lower extremity exercise program x15 reps per handout, with supervision    Outcome: Ongoing, Progressing      None
